# Patient Record
Sex: FEMALE | Race: BLACK OR AFRICAN AMERICAN | NOT HISPANIC OR LATINO | URBAN - METROPOLITAN AREA
[De-identification: names, ages, dates, MRNs, and addresses within clinical notes are randomized per-mention and may not be internally consistent; named-entity substitution may affect disease eponyms.]

---

## 2017-04-15 ENCOUNTER — OUTPATIENT (OUTPATIENT)
Dept: OUTPATIENT SERVICES | Facility: HOSPITAL | Age: 72
LOS: 1 days | Discharge: HOME | End: 2017-04-15

## 2017-06-27 DIAGNOSIS — D53.9 NUTRITIONAL ANEMIA, UNSPECIFIED: ICD-10-CM

## 2017-06-27 DIAGNOSIS — E03.9 HYPOTHYROIDISM, UNSPECIFIED: ICD-10-CM

## 2017-08-05 ENCOUNTER — OUTPATIENT (OUTPATIENT)
Dept: OUTPATIENT SERVICES | Facility: HOSPITAL | Age: 72
LOS: 1 days | Discharge: HOME | End: 2017-08-05

## 2017-08-05 DIAGNOSIS — D53.9 NUTRITIONAL ANEMIA, UNSPECIFIED: ICD-10-CM

## 2017-08-05 DIAGNOSIS — D51.0 VITAMIN B12 DEFICIENCY ANEMIA DUE TO INTRINSIC FACTOR DEFICIENCY: ICD-10-CM

## 2018-01-30 ENCOUNTER — OUTPATIENT (OUTPATIENT)
Dept: OUTPATIENT SERVICES | Facility: HOSPITAL | Age: 73
LOS: 1 days | Discharge: HOME | End: 2018-01-30

## 2018-01-30 DIAGNOSIS — Z00.00 ENCOUNTER FOR GENERAL ADULT MEDICAL EXAMINATION WITHOUT ABNORMAL FINDINGS: ICD-10-CM

## 2018-01-30 DIAGNOSIS — N39.0 URINARY TRACT INFECTION, SITE NOT SPECIFIED: ICD-10-CM

## 2018-01-30 DIAGNOSIS — E55.9 VITAMIN D DEFICIENCY, UNSPECIFIED: ICD-10-CM

## 2018-01-30 DIAGNOSIS — E11.9 TYPE 2 DIABETES MELLITUS WITHOUT COMPLICATIONS: ICD-10-CM

## 2018-01-30 DIAGNOSIS — E03.9 HYPOTHYROIDISM, UNSPECIFIED: ICD-10-CM

## 2018-02-05 ENCOUNTER — OUTPATIENT (OUTPATIENT)
Dept: OUTPATIENT SERVICES | Facility: HOSPITAL | Age: 73
LOS: 1 days | Discharge: HOME | End: 2018-02-05

## 2018-02-05 DIAGNOSIS — E55.9 VITAMIN D DEFICIENCY, UNSPECIFIED: ICD-10-CM

## 2018-02-05 DIAGNOSIS — E78.5 HYPERLIPIDEMIA, UNSPECIFIED: ICD-10-CM

## 2018-02-05 DIAGNOSIS — Z00.00 ENCOUNTER FOR GENERAL ADULT MEDICAL EXAMINATION WITHOUT ABNORMAL FINDINGS: ICD-10-CM

## 2018-05-24 ENCOUNTER — OUTPATIENT (OUTPATIENT)
Dept: OUTPATIENT SERVICES | Facility: HOSPITAL | Age: 73
LOS: 1 days | Discharge: HOME | End: 2018-05-24

## 2018-05-24 DIAGNOSIS — E03.9 HYPOTHYROIDISM, UNSPECIFIED: ICD-10-CM

## 2018-05-24 DIAGNOSIS — D51.0 VITAMIN B12 DEFICIENCY ANEMIA DUE TO INTRINSIC FACTOR DEFICIENCY: ICD-10-CM

## 2018-05-24 DIAGNOSIS — E11.9 TYPE 2 DIABETES MELLITUS WITHOUT COMPLICATIONS: ICD-10-CM

## 2018-05-24 DIAGNOSIS — N39.0 URINARY TRACT INFECTION, SITE NOT SPECIFIED: ICD-10-CM

## 2018-05-24 DIAGNOSIS — D51.8 OTHER VITAMIN B12 DEFICIENCY ANEMIAS: ICD-10-CM

## 2018-05-24 DIAGNOSIS — D53.9 NUTRITIONAL ANEMIA, UNSPECIFIED: ICD-10-CM

## 2018-05-24 DIAGNOSIS — E78.5 HYPERLIPIDEMIA, UNSPECIFIED: ICD-10-CM

## 2018-05-24 DIAGNOSIS — E55.9 VITAMIN D DEFICIENCY, UNSPECIFIED: ICD-10-CM

## 2018-10-12 ENCOUNTER — OUTPATIENT (OUTPATIENT)
Dept: OUTPATIENT SERVICES | Facility: HOSPITAL | Age: 73
LOS: 1 days | Discharge: HOME | End: 2018-10-12

## 2018-10-12 DIAGNOSIS — N39.0 URINARY TRACT INFECTION, SITE NOT SPECIFIED: ICD-10-CM

## 2018-10-12 DIAGNOSIS — D53.9 NUTRITIONAL ANEMIA, UNSPECIFIED: ICD-10-CM

## 2018-10-12 DIAGNOSIS — E03.9 HYPOTHYROIDISM, UNSPECIFIED: ICD-10-CM

## 2018-10-12 DIAGNOSIS — E11.9 TYPE 2 DIABETES MELLITUS WITHOUT COMPLICATIONS: ICD-10-CM

## 2018-10-12 DIAGNOSIS — E78.5 HYPERLIPIDEMIA, UNSPECIFIED: ICD-10-CM

## 2019-02-08 ENCOUNTER — OUTPATIENT (OUTPATIENT)
Dept: OUTPATIENT SERVICES | Facility: HOSPITAL | Age: 74
LOS: 1 days | Discharge: HOME | End: 2019-02-08

## 2019-02-08 DIAGNOSIS — E78.5 HYPERLIPIDEMIA, UNSPECIFIED: ICD-10-CM

## 2019-02-08 DIAGNOSIS — E11.9 TYPE 2 DIABETES MELLITUS WITHOUT COMPLICATIONS: ICD-10-CM

## 2019-02-08 DIAGNOSIS — E03.9 HYPOTHYROIDISM, UNSPECIFIED: ICD-10-CM

## 2019-02-08 DIAGNOSIS — D53.9 NUTRITIONAL ANEMIA, UNSPECIFIED: ICD-10-CM

## 2019-02-08 DIAGNOSIS — D51.0 VITAMIN B12 DEFICIENCY ANEMIA DUE TO INTRINSIC FACTOR DEFICIENCY: ICD-10-CM

## 2019-05-07 ENCOUNTER — OUTPATIENT (OUTPATIENT)
Dept: OUTPATIENT SERVICES | Facility: HOSPITAL | Age: 74
LOS: 1 days | Discharge: HOME | End: 2019-05-07

## 2019-05-07 DIAGNOSIS — E78.5 HYPERLIPIDEMIA, UNSPECIFIED: ICD-10-CM

## 2019-05-07 DIAGNOSIS — D53.9 NUTRITIONAL ANEMIA, UNSPECIFIED: ICD-10-CM

## 2019-05-07 DIAGNOSIS — E03.9 HYPOTHYROIDISM, UNSPECIFIED: ICD-10-CM

## 2019-05-07 DIAGNOSIS — D51.0 VITAMIN B12 DEFICIENCY ANEMIA DUE TO INTRINSIC FACTOR DEFICIENCY: ICD-10-CM

## 2019-05-07 DIAGNOSIS — E11.9 TYPE 2 DIABETES MELLITUS WITHOUT COMPLICATIONS: ICD-10-CM

## 2019-09-09 ENCOUNTER — OUTPATIENT (OUTPATIENT)
Dept: OUTPATIENT SERVICES | Facility: HOSPITAL | Age: 74
LOS: 1 days | Discharge: HOME | End: 2019-09-09

## 2019-09-09 DIAGNOSIS — D53.9 NUTRITIONAL ANEMIA, UNSPECIFIED: ICD-10-CM

## 2019-09-09 DIAGNOSIS — E78.5 HYPERLIPIDEMIA, UNSPECIFIED: ICD-10-CM

## 2019-09-09 DIAGNOSIS — N39.0 URINARY TRACT INFECTION, SITE NOT SPECIFIED: ICD-10-CM

## 2019-09-09 DIAGNOSIS — E11.9 TYPE 2 DIABETES MELLITUS WITHOUT COMPLICATIONS: ICD-10-CM

## 2023-02-28 PROBLEM — Z00.00 ENCOUNTER FOR PREVENTIVE HEALTH EXAMINATION: Status: ACTIVE | Noted: 2023-02-28

## 2023-03-03 ENCOUNTER — APPOINTMENT (OUTPATIENT)
Dept: CARDIOLOGY | Facility: CLINIC | Age: 78
End: 2023-03-03
Payer: MEDICARE

## 2023-03-03 VITALS
BODY MASS INDEX: 43.63 KG/M2 | TEMPERATURE: 97.6 F | SYSTOLIC BLOOD PRESSURE: 134 MMHG | DIASTOLIC BLOOD PRESSURE: 80 MMHG | HEART RATE: 78 BPM | WEIGHT: 278 LBS | HEIGHT: 67 IN

## 2023-03-03 DIAGNOSIS — Z82.49 FAMILY HISTORY OF ISCHEMIC HEART DISEASE AND OTHER DISEASES OF THE CIRCULATORY SYSTEM: ICD-10-CM

## 2023-03-03 DIAGNOSIS — Z78.9 OTHER SPECIFIED HEALTH STATUS: ICD-10-CM

## 2023-03-03 PROCEDURE — 93000 ELECTROCARDIOGRAM COMPLETE: CPT

## 2023-03-03 PROCEDURE — 99203 OFFICE O/P NEW LOW 30 MIN: CPT | Mod: 25

## 2023-03-04 PROBLEM — Z82.49 FAMILY HISTORY OF CEREBRAL ANEURYSM: Status: ACTIVE | Noted: 2023-03-04

## 2023-03-04 PROBLEM — Z78.9 NON-SMOKER: Status: ACTIVE | Noted: 2023-03-03

## 2023-03-04 RX ORDER — ALBUTEROL SULFATE 90 UG/1
108 (90 BASE) AEROSOL, METERED RESPIRATORY (INHALATION)
Refills: 0 | Status: ACTIVE | COMMUNITY

## 2023-03-04 NOTE — ASSESSMENT
[FreeTextEntry1] : 77 year old female is here post hospital discharge [facility]  for lower extremity edema. the patient also mentioned that she was diagnosed with real failure. S/p diuresis and feels better now. LE edema improved. \par \par Plan:\par Continue current meds \par instruction to increase diuretics if LE worsened and call the office for instructions. \par will obtain records and labs and cardiac testing fro [facility] \par follow up in 1-2 weeks \par

## 2023-03-04 NOTE — HISTORY OF PRESENT ILLNESS
[FreeTextEntry1] : 77 year old female is here post hospital discharge [facility]  for lower extremity edema. the patient also mentioned that she was diagnosed with real failure. S/p diuresis and feels better now. LE edema improved.

## 2023-03-17 ENCOUNTER — APPOINTMENT (OUTPATIENT)
Dept: CARDIOLOGY | Facility: CLINIC | Age: 78
End: 2023-03-17
Payer: MEDICARE

## 2023-03-17 ENCOUNTER — RESULT CHARGE (OUTPATIENT)
Age: 78
End: 2023-03-17

## 2023-03-17 VITALS
HEIGHT: 67 IN | SYSTOLIC BLOOD PRESSURE: 140 MMHG | WEIGHT: 280 LBS | HEART RATE: 88 BPM | TEMPERATURE: 97.7 F | DIASTOLIC BLOOD PRESSURE: 70 MMHG | BODY MASS INDEX: 43.95 KG/M2

## 2023-03-17 DIAGNOSIS — R05.9 COUGH, UNSPECIFIED: ICD-10-CM

## 2023-03-17 DIAGNOSIS — R06.2 WHEEZING: ICD-10-CM

## 2023-03-17 DIAGNOSIS — R06.00 DYSPNEA, UNSPECIFIED: ICD-10-CM

## 2023-03-17 LAB
ALBUMIN SERPL ELPH-MCNC: 4.1 G/DL
ALP BLD-CCNC: 91 U/L
ALT SERPL-CCNC: 11 U/L
ANION GAP SERPL CALC-SCNC: 14 MMOL/L
AST SERPL-CCNC: 18 U/L
BILIRUB SERPL-MCNC: 0.4 MG/DL
BUN SERPL-MCNC: 14 MG/DL
CALCIUM SERPL-MCNC: 9.9 MG/DL
CHLORIDE SERPL-SCNC: 102 MMOL/L
CO2 SERPL-SCNC: 25 MMOL/L
CREAT SERPL-MCNC: 1 MG/DL
EGFR: 58 ML/MIN/1.73M2
ESTIMATED AVERAGE GLUCOSE: 126 MG/DL
GLUCOSE SERPL-MCNC: 114 MG/DL
HBA1C MFR BLD HPLC: 6 %
HCT VFR BLD CALC: 37.5 %
HGB BLD-MCNC: 11.6 G/DL
MCHC RBC-ENTMCNC: 29.6 PG
MCHC RBC-ENTMCNC: 30.9 G/DL
MCV RBC AUTO: 95.7 FL
PLATELET # BLD AUTO: 364 K/UL
PMV BLD: 9 FL
POTASSIUM SERPL-SCNC: 4.4 MMOL/L
PROT SERPL-MCNC: 7 G/DL
RBC # BLD: 3.92 M/UL
RBC # FLD: 12.2 %
SODIUM SERPL-SCNC: 141 MMOL/L
TSH SERPL-ACNC: 1.42 UIU/ML
WBC # FLD AUTO: 9.97 K/UL

## 2023-03-17 PROCEDURE — 93000 ELECTROCARDIOGRAM COMPLETE: CPT

## 2023-03-17 PROCEDURE — 93306 TTE W/DOPPLER COMPLETE: CPT

## 2023-03-17 PROCEDURE — 99214 OFFICE O/P EST MOD 30 MIN: CPT | Mod: 25

## 2023-03-17 NOTE — PHYSICAL EXAM
[No Acute Distress] : no acute distress [Normal S1, S2] : normal S1, S2 [No Murmur] : no murmur [Wheeze ____] : wheeze [unfilled] [Normal] : moves all extremities, no focal deficits, normal speech [de-identified] : bl led edema

## 2023-03-17 NOTE — ASSESSMENT
[FreeTextEntry1] : 77 year old female is here post hospital discharge [facility]  for lower extremity edema. the patient also mentioned that she was diagnosed with renal failure. Unsuccessful attempts to get records from [facility]. The patient has cough ans wheezing today. Echo done today showed NL EF with no significant elevation in LV pressures SPAP 46 mmHg. Labs ordered today and reviewed. Continues to have LE edema. \par \par Plan:\par Dyspnea most likely pulmonary in origin \par No evidence of cardiac failure on echo \par CXR pulmonary referral \par increase Lasix to 40 q 24 \par nephrology referral ( single kidney) of evidence of Redd on labs from today. \par labs reviewed \par

## 2023-03-17 NOTE — HISTORY OF PRESENT ILLNESS
[FreeTextEntry1] : 77 year old female is here post hospital discharge [facility]  for lower extremity edema. the patient also mentioned that she was diagnosed with renal failure. Unsuccessful attempts to get records from [facility]. The patient has cough ans wheezing today. Echo done today showed NL EF with no significant elevation in LV pressures SPAP 46 mmHg. Labs ordered today and reviewed. Continues to have LE edema.

## 2023-03-17 NOTE — REVIEW OF SYSTEMS
[Feeling Fatigued] : feeling fatigued [SOB] : shortness of breath [Lower Ext Edema] : lower extremity edema [Wheezing] : wheezing [Negative] : Neurological

## 2023-07-10 ENCOUNTER — INPATIENT (INPATIENT)
Facility: HOSPITAL | Age: 78
LOS: 5 days | Discharge: HOME | DRG: 641 | End: 2023-07-16
Attending: INTERNAL MEDICINE | Admitting: INTERNAL MEDICINE
Payer: MEDICARE

## 2023-07-10 VITALS
HEART RATE: 95 BPM | TEMPERATURE: 98 F | RESPIRATION RATE: 20 BRPM | WEIGHT: 293 LBS | SYSTOLIC BLOOD PRESSURE: 182 MMHG | DIASTOLIC BLOOD PRESSURE: 86 MMHG | OXYGEN SATURATION: 96 % | HEIGHT: 67 IN

## 2023-07-10 DIAGNOSIS — Z90.5 ACQUIRED ABSENCE OF KIDNEY: ICD-10-CM

## 2023-07-10 DIAGNOSIS — I87.8 OTHER SPECIFIED DISORDERS OF VEINS: ICD-10-CM

## 2023-07-10 DIAGNOSIS — E66.01 MORBID (SEVERE) OBESITY DUE TO EXCESS CALORIES: ICD-10-CM

## 2023-07-10 DIAGNOSIS — C64.2 MALIGNANT NEOPLASM OF LEFT KIDNEY, EXCEPT RENAL PELVIS: ICD-10-CM

## 2023-07-10 DIAGNOSIS — M71.22 SYNOVIAL CYST OF POPLITEAL SPACE [BAKER], LEFT KNEE: ICD-10-CM

## 2023-07-10 DIAGNOSIS — Z98.890 OTHER SPECIFIED POSTPROCEDURAL STATES: Chronic | ICD-10-CM

## 2023-07-10 DIAGNOSIS — T46.1X5A ADVERSE EFFECT OF CALCIUM-CHANNEL BLOCKERS, INITIAL ENCOUNTER: ICD-10-CM

## 2023-07-10 DIAGNOSIS — R18.8 OTHER ASCITES: ICD-10-CM

## 2023-07-10 DIAGNOSIS — N18.31 CHRONIC KIDNEY DISEASE, STAGE 3A: ICD-10-CM

## 2023-07-10 DIAGNOSIS — E87.70 FLUID OVERLOAD, UNSPECIFIED: ICD-10-CM

## 2023-07-10 DIAGNOSIS — I12.9 HYPERTENSIVE CHRONIC KIDNEY DISEASE WITH STAGE 1 THROUGH STAGE 4 CHRONIC KIDNEY DISEASE, OR UNSPECIFIED CHRONIC KIDNEY DISEASE: ICD-10-CM

## 2023-07-10 DIAGNOSIS — Z90.5 ACQUIRED ABSENCE OF KIDNEY: Chronic | ICD-10-CM

## 2023-07-10 DIAGNOSIS — Z79.84 LONG TERM (CURRENT) USE OF ORAL HYPOGLYCEMIC DRUGS: ICD-10-CM

## 2023-07-10 DIAGNOSIS — Z91.010 ALLERGY TO PEANUTS: ICD-10-CM

## 2023-07-10 DIAGNOSIS — J44.9 CHRONIC OBSTRUCTIVE PULMONARY DISEASE, UNSPECIFIED: ICD-10-CM

## 2023-07-10 DIAGNOSIS — I27.20 PULMONARY HYPERTENSION, UNSPECIFIED: ICD-10-CM

## 2023-07-10 DIAGNOSIS — E87.79 OTHER FLUID OVERLOAD: ICD-10-CM

## 2023-07-10 DIAGNOSIS — M71.21 SYNOVIAL CYST OF POPLITEAL SPACE [BAKER], RIGHT KNEE: ICD-10-CM

## 2023-07-10 DIAGNOSIS — E11.22 TYPE 2 DIABETES MELLITUS WITH DIABETIC CHRONIC KIDNEY DISEASE: ICD-10-CM

## 2023-07-10 LAB
ALBUMIN SERPL ELPH-MCNC: 4.3 G/DL — SIGNIFICANT CHANGE UP (ref 3.5–5.2)
ALP SERPL-CCNC: 125 U/L — HIGH (ref 30–115)
ALT FLD-CCNC: 11 U/L — SIGNIFICANT CHANGE UP (ref 0–41)
ANION GAP SERPL CALC-SCNC: 14 MMOL/L — SIGNIFICANT CHANGE UP (ref 7–14)
AST SERPL-CCNC: 17 U/L — SIGNIFICANT CHANGE UP (ref 0–41)
BASOPHILS # BLD AUTO: 0.01 K/UL — SIGNIFICANT CHANGE UP (ref 0–0.2)
BASOPHILS NFR BLD AUTO: 0.1 % — SIGNIFICANT CHANGE UP (ref 0–1)
BILIRUB SERPL-MCNC: 0.5 MG/DL — SIGNIFICANT CHANGE UP (ref 0.2–1.2)
BUN SERPL-MCNC: 20 MG/DL — SIGNIFICANT CHANGE UP (ref 10–20)
CALCIUM SERPL-MCNC: 9.7 MG/DL — SIGNIFICANT CHANGE UP (ref 8.4–10.5)
CHLORIDE SERPL-SCNC: 101 MMOL/L — SIGNIFICANT CHANGE UP (ref 98–110)
CO2 SERPL-SCNC: 25 MMOL/L — SIGNIFICANT CHANGE UP (ref 17–32)
CREAT SERPL-MCNC: 1.2 MG/DL — SIGNIFICANT CHANGE UP (ref 0.7–1.5)
EGFR: 46 ML/MIN/1.73M2 — LOW
EOSINOPHIL # BLD AUTO: 0.33 K/UL — SIGNIFICANT CHANGE UP (ref 0–0.7)
EOSINOPHIL NFR BLD AUTO: 3.9 % — SIGNIFICANT CHANGE UP (ref 0–8)
GLUCOSE BLDC GLUCOMTR-MCNC: 133 MG/DL — HIGH (ref 70–99)
GLUCOSE SERPL-MCNC: 120 MG/DL — HIGH (ref 70–99)
HCT VFR BLD CALC: 44.6 % — SIGNIFICANT CHANGE UP (ref 37–47)
HGB BLD-MCNC: 14.5 G/DL — SIGNIFICANT CHANGE UP (ref 12–16)
IMM GRANULOCYTES NFR BLD AUTO: 0.4 % — HIGH (ref 0.1–0.3)
LYMPHOCYTES # BLD AUTO: 1.55 K/UL — SIGNIFICANT CHANGE UP (ref 1.2–3.4)
LYMPHOCYTES # BLD AUTO: 18.1 % — LOW (ref 20.5–51.1)
MCHC RBC-ENTMCNC: 30.3 PG — SIGNIFICANT CHANGE UP (ref 27–31)
MCHC RBC-ENTMCNC: 32.5 G/DL — SIGNIFICANT CHANGE UP (ref 32–37)
MCV RBC AUTO: 93.1 FL — SIGNIFICANT CHANGE UP (ref 81–99)
MONOCYTES # BLD AUTO: 0.6 K/UL — SIGNIFICANT CHANGE UP (ref 0.1–0.6)
MONOCYTES NFR BLD AUTO: 7 % — SIGNIFICANT CHANGE UP (ref 1.7–9.3)
NEUTROPHILS # BLD AUTO: 6.05 K/UL — SIGNIFICANT CHANGE UP (ref 1.4–6.5)
NEUTROPHILS NFR BLD AUTO: 70.5 % — SIGNIFICANT CHANGE UP (ref 42.2–75.2)
NRBC # BLD: 0 /100 WBCS — SIGNIFICANT CHANGE UP (ref 0–0)
NT-PROBNP SERPL-SCNC: 171 PG/ML — SIGNIFICANT CHANGE UP (ref 0–300)
PLATELET # BLD AUTO: 297 K/UL — SIGNIFICANT CHANGE UP (ref 130–400)
PMV BLD: 9 FL — SIGNIFICANT CHANGE UP (ref 7.4–10.4)
POTASSIUM SERPL-MCNC: 4.3 MMOL/L — SIGNIFICANT CHANGE UP (ref 3.5–5)
POTASSIUM SERPL-SCNC: 4.3 MMOL/L — SIGNIFICANT CHANGE UP (ref 3.5–5)
PROT SERPL-MCNC: 7.6 G/DL — SIGNIFICANT CHANGE UP (ref 6–8)
RBC # BLD: 4.79 M/UL — SIGNIFICANT CHANGE UP (ref 4.2–5.4)
RBC # FLD: 13.2 % — SIGNIFICANT CHANGE UP (ref 11.5–14.5)
SODIUM SERPL-SCNC: 140 MMOL/L — SIGNIFICANT CHANGE UP (ref 135–146)
TROPONIN T SERPL-MCNC: <0.01 NG/ML — SIGNIFICANT CHANGE UP
WBC # BLD: 8.57 K/UL — SIGNIFICANT CHANGE UP (ref 4.8–10.8)
WBC # FLD AUTO: 8.57 K/UL — SIGNIFICANT CHANGE UP (ref 4.8–10.8)

## 2023-07-10 PROCEDURE — 86900 BLOOD TYPING SEROLOGIC ABO: CPT

## 2023-07-10 PROCEDURE — 85610 PROTHROMBIN TIME: CPT

## 2023-07-10 PROCEDURE — 94640 AIRWAY INHALATION TREATMENT: CPT

## 2023-07-10 PROCEDURE — 85027 COMPLETE CBC AUTOMATED: CPT

## 2023-07-10 PROCEDURE — 83735 ASSAY OF MAGNESIUM: CPT

## 2023-07-10 PROCEDURE — 99285 EMERGENCY DEPT VISIT HI MDM: CPT

## 2023-07-10 PROCEDURE — 93970 EXTREMITY STUDY: CPT

## 2023-07-10 PROCEDURE — 85025 COMPLETE CBC W/AUTO DIFF WBC: CPT

## 2023-07-10 PROCEDURE — 74177 CT ABD & PELVIS W/CONTRAST: CPT

## 2023-07-10 PROCEDURE — 83036 HEMOGLOBIN GLYCOSYLATED A1C: CPT

## 2023-07-10 PROCEDURE — 86850 RBC ANTIBODY SCREEN: CPT

## 2023-07-10 PROCEDURE — 80061 LIPID PANEL: CPT

## 2023-07-10 PROCEDURE — 82962 GLUCOSE BLOOD TEST: CPT

## 2023-07-10 PROCEDURE — 97162 PT EVAL MOD COMPLEX 30 MIN: CPT | Mod: GP

## 2023-07-10 PROCEDURE — 80053 COMPREHEN METABOLIC PANEL: CPT

## 2023-07-10 PROCEDURE — 99223 1ST HOSP IP/OBS HIGH 75: CPT

## 2023-07-10 PROCEDURE — 71045 X-RAY EXAM CHEST 1 VIEW: CPT | Mod: 26

## 2023-07-10 PROCEDURE — 80048 BASIC METABOLIC PNL TOTAL CA: CPT

## 2023-07-10 PROCEDURE — 36415 COLL VENOUS BLD VENIPUNCTURE: CPT

## 2023-07-10 PROCEDURE — 85730 THROMBOPLASTIN TIME PARTIAL: CPT

## 2023-07-10 PROCEDURE — 86901 BLOOD TYPING SEROLOGIC RH(D): CPT

## 2023-07-10 PROCEDURE — 93306 TTE W/DOPPLER COMPLETE: CPT

## 2023-07-10 PROCEDURE — 76770 US EXAM ABDO BACK WALL COMP: CPT

## 2023-07-10 RX ORDER — PIOGLITAZONE HYDROCHLORIDE 15 MG/1
1 TABLET ORAL
Refills: 0 | DISCHARGE

## 2023-07-10 RX ORDER — ALBUTEROL 90 UG/1
2 AEROSOL, METERED ORAL EVERY 6 HOURS
Refills: 0 | Status: DISCONTINUED | OUTPATIENT
Start: 2023-07-10 | End: 2023-07-16

## 2023-07-10 RX ORDER — FLUTICASONE FUROATE AND VILANTEROL TRIFENATATE 100; 25 UG/1; UG/1
1 POWDER RESPIRATORY (INHALATION)
Refills: 0 | DISCHARGE

## 2023-07-10 RX ORDER — METFORMIN HYDROCHLORIDE 850 MG/1
1 TABLET ORAL
Refills: 0 | DISCHARGE

## 2023-07-10 RX ORDER — FUROSEMIDE 40 MG
40 TABLET ORAL
Refills: 0 | Status: DISCONTINUED | OUTPATIENT
Start: 2023-07-10 | End: 2023-07-11

## 2023-07-10 RX ORDER — NIFEDIPINE 30 MG
60 TABLET, EXTENDED RELEASE 24 HR ORAL DAILY
Refills: 0 | Status: DISCONTINUED | OUTPATIENT
Start: 2023-07-10 | End: 2023-07-13

## 2023-07-10 RX ORDER — BUDESONIDE AND FORMOTEROL FUMARATE DIHYDRATE 160; 4.5 UG/1; UG/1
2 AEROSOL RESPIRATORY (INHALATION)
Refills: 0 | Status: DISCONTINUED | OUTPATIENT
Start: 2023-07-10 | End: 2023-07-16

## 2023-07-10 RX ORDER — ALBUTEROL 90 UG/1
2 AEROSOL, METERED ORAL
Refills: 0 | DISCHARGE

## 2023-07-10 RX ORDER — ENOXAPARIN SODIUM 100 MG/ML
40 INJECTION SUBCUTANEOUS EVERY 24 HOURS
Refills: 0 | Status: DISCONTINUED | OUTPATIENT
Start: 2023-07-10 | End: 2023-07-16

## 2023-07-10 RX ADMIN — Medication 40 MILLIGRAM(S): at 17:04

## 2023-07-10 NOTE — H&P ADULT - NSHPLABSRESULTS_GEN_ALL_CORE
14.5   8.57  )-----------( 297      ( 10 Jul 2023 13:26 )             44.6       07-10    140  |  101  |  20  ----------------------------<  120<H>  4.3   |  25  |  1.2    Ca    9.7      10 Jul 2023 13:26    TPro  7.6  /  Alb  4.3  /  TBili  0.5  /  DBili  x   /  AST  17  /  ALT  11  /  AlkPhos  125<H>  07-10              Urinalysis Basic - ( 10 Jul 2023 13:26 )    Color: x / Appearance: x / SG: x / pH: x  Gluc: 120 mg/dL / Ketone: x  / Bili: x / Urobili: x   Blood: x / Protein: x / Nitrite: x   Leuk Esterase: x / RBC: x / WBC x   Sq Epi: x / Non Sq Epi: x / Bacteria: x        CARDIAC MARKERS ( 10 Jul 2023 13:26 )  x     / <0.01 ng/mL / x     / x     / x

## 2023-07-10 NOTE — H&P ADULT - NSHPPHYSICALEXAM_GEN_ALL_CORE
PHYSICAL EXAM:  GENERAL: NAD  EYES: conjunctiva and sclera clear  ENMT: No tonsillar erythema, exudates, or enlargement; Moist mucous membranes  NECK: Supple, No JVD, Normal thyroid  HEART: Regular rate and rhythm; No murmurs, rubs, or gallops, Normal S1 S2   RESPIRATORY: Clear to auscultation bilaterally, resonant percussion note, no added sounds   ABDOMEN: Soft, Nontender, distended; Bowel sounds present  NEUROLOGY: A&Ox3, grossly intact power and sensation   EXTREMITIES:  2+ Peripheral Pulses, +4 pitting edema up to the hip  SKIN: warm, dry, normal color, no rash or abnormal lesions

## 2023-07-10 NOTE — ED PROVIDER NOTE - PHYSICAL EXAMINATION
VITAL SIGNS: I have reviewed nursing notes and confirm.  CONSTITUTIONAL: Well-appearing, non-toxic, in NAD  SKIN: Warm dry, normal skin turgor  HEAD: NCAT  EYES: No conjunctival injection, scleral anicteric  ENT: MMM  NECK: Supple; FROM.   CARD: RRR  RESP: CTAB  ABD: Soft, NDNT  EXT: 2+ pitting edema b/l without calf tenderness, no skin changes.   NEURO: Grossly normal examination, CN grossly intact  PSYCH: Cooperative, appropriate

## 2023-07-10 NOTE — ED PROVIDER NOTE - ATTENDING CONTRIBUTION TO CARE
77-year-old female with a past medical history significant for renal cancer status post nephrectomy hypertension diabetes who presents with lower extremity swelling.  Patient states that since January she has been having lower extremity edema.  Patient denies any nausea vomiting chest pain or any other medical complaints.    VITAL SIGNS: I have reviewed nursing notes and confirm.  CONSTITUTIONAL: non-toxic, well appearing  SKIN: no rash, no petechiae.  EYES: L, EOMI, pink conjunctiva, anicteric  ENT: tongue midline, no exudates, MMM  NECK: Supple; no meningismus, no JVD  CARD: RRR, no murmurs, equal radial pulses bilaterally 2+  RESP: CTAB, no respiratory distress  ABD: Soft, non-tender, non-distended, no peritoneal signs, no HSM, no CVA tenderness  EXT: Normal ROM x4.  +2 pitting edema.     77-year-old female presents with lower extremity edema.  EKG imaging labs to reassess dispo pending.

## 2023-07-10 NOTE — H&P ADULT - NSICDXPASTMEDICALHX_GEN_ALL_CORE_FT
PAST MEDICAL HISTORY:  Asthmatic bronchitis , chronic     Hypertension     Primary cancer of kidney     Type 2 diabetes mellitus

## 2023-07-10 NOTE — ED PROVIDER NOTE - OBJECTIVE STATEMENT
76yo female PMHx solitary kidney s/p renal ca, HTN, and DM presenting with atraumatic BLE swelling worsening over the past 2 weeks without any associated cp, cough, dizziness/lightheadedness/syncope, n/v/d/ap/us. Pt says she is compliant with her Lasix. No hx blood clots.     Per YAMILKA, pt seen by cardiology in 03/2023 and had a normal echo. Admitted previously to [facility] for swelling. 78yo female PMHx solitary kidney s/p renal ca, HTN, and DM presenting with atraumatic BLE swelling worsening over the past 2 weeks, notes that she feels "weak" when exerting herself. No associated cp, cough, dizziness/lightheadedness/syncope, n/v/d/ap/us. Pt says she is compliant with her Lasix. No hx blood clots.     Per YAMILKA pt seen by cardiology (Paul) in 03/2023 and had a normal echo, he doubled her Lasix. Admitted previously to [facility] for swelling.

## 2023-07-10 NOTE — ED PROVIDER NOTE - CLINICAL SUMMARY MEDICAL DECISION MAKING FREE TEXT BOX
77-year-old female presents with lower extremity edema.  EKG imaging labs. concern for fluid overload. Labs and EKG were ordered and reviewed.  Imaging was ordered and reviewed by me.  Appropriate medications for patient's presenting complaints were ordered and effects were reassessed.  Patient's records (prior hospital, ED visit, and/or nursing home notes if available) were reviewed.  Additional history was obtained from EMS, family, and/or PCP (where available).  Escalation to admission/observation was considered.  Patient requires inpatient hospitalization - medicine.

## 2023-07-10 NOTE — H&P ADULT - NSHPSOCIALHISTORY_GEN_ALL_CORE
No history of smoking  occasional alcohol intake  No recreational drug use  Used to work as nurses aid

## 2023-07-10 NOTE — H&P ADULT - ATTENDING COMMENTS
Patient seen and examined at bedside independently of the residents. I read the resident's note and agree with the plan with the additions and corrections as noted below. My note supersedes the resident's note.     REVIEW OF SYSTEMS:  Negative except in HPI.     PMH: HTN, DM, Asthma, renal cancer S/P left nephrectomy, HFpEF?    FHx: Reviewed. No fhx of asthma/copd, No fhx of liver and pulmonary disease. No fhx of hematological disorder.     Physical Exam:  GEN: No acute distress. Awake, Alert and oriented x 3.   Head: Atraumatic, Normocephalic.   Eye: PEERLA. No sclera icterus. EOMI.   ENT: Normal oropharynx, no thyromegaly, no mass, no lymphadenopathy.   LUNGS: Clear to auscultation bilaterally. No wheeze/rales/crackles.   HEART: Normal. S1/S2 present. RRR. No murmur/gallops.   ABD: Soft, non-tender, non-distended. Bowel sounds present.   EXT: 2+ pitting edema. No erythema. No tenderness.  Integumentary: No rash, No sore, No petechia.   NEURO: CN III-XII intact. Strength: 5/5 b/l ULE. Sensory intact b/l ULE.     Vital Signs Last 24 Hrs  T(C): 36.6 (10 Jul 2023 11:31), Max: 36.6 (10 Jul 2023 11:31)  T(F): 97.8 (10 Jul 2023 11:31), Max: 97.8 (10 Jul 2023 11:31)  HR: 71 (10 Jul 2023 15:26) (71 - 95)  BP: 155/76 (10 Jul 2023 15:26) (155/76 - 182/86)  BP(mean): --  RR: 16 (10 Jul 2023 15:26) (16 - 20)  SpO2: 97% (10 Jul 2023 15:26) (96% - 97%)    Parameters below as of 10 Jul 2023 15:26  Patient On (Oxygen Delivery Method): room air      Please see the above notes for Labs and radiology.     Assessment and Plan:     76 yo F with hx of HTN, DM, Asthma, renal cancer S/P left nephrectomy, HFpEF? presents to ED for 2 weeks hx of worsening LE  edema a/w dyspnea on exertion.     Dyspnea on exertion and LE edema likely 2/2 acute CHF exacerbation   - CXR shows b/l pulmonary vascular congestion with interstitial opacity to my read (pending official report).   - s/p IV lasix 40mg x 1 in ED.   - c/w IV lasix 40mg BID  - check TTE  - monitor daily weight, strict I & O, Low Na diet with fluid restriction  - Cardiology consult.     HTN - on nifedipine   DM II - monitor FS AC HS. May start on insulin if FS persistently > 180.   Left renal cancer s/p left nephrectomy - follow up outpatient.     DVT ppx: Lovenox SC  GI ppx:  not indicated.   Diet: DASH/CC diet   Activity: as tolerated.     Date seen by the attendin/10/2023  Total time spent: 75 minutes.

## 2023-07-10 NOTE — H&P ADULT - NSHPREVIEWOFSYSTEMS_GEN_ALL_CORE
REVIEW OF SYSTEMS:    CONSTITUTIONAL: generalized weakness, No fevers or chills  EYES/ENT: No visual changes;  No vertigo or throat pain, no headache   NECK: No pain or stiffness  RESPIRATORY: No cough, wheezing, hemoptysis; dyspnea on exertion   CARDIOVASCULAR: In HPI   GASTROINTESTINAL: dull left flank pain. No nausea, vomiting, or hematemesis; No diarrhea or constipation. No melena or hematochezia.  GENITOURINARY: No dysuria, frequency or hematuria  NEUROLOGICAL: No numbness or weakness  SKIN: No itching, rashes  MUSCULOSKELETAL: no arthralgia or arthritis

## 2023-07-10 NOTE — H&P ADULT - HISTORY OF PRESENT ILLNESS
77 year old female with past medical history of HTN, DM, Asthma, renal cancer S/P left nephrectomy, HFpEF?. She was doing relatively well until 2 weeks ago when she started noticing progressive lower limb edema started from ankles and going up to thighs. It is associated with dyspnea on exertion with left flank dull pain. There is no chest pain, no palpitations, no orthopnea, or paroxysmal nocturnal dyspnea. She is compliant with her medications, no change in diet, no extra salt intake. No fever or chills, no arthralgia. No change in urine amount, color or consistency.     She was admitted in January due to lower limb edema to [facility], treated with diuretics and discharged home without final diagnosis. Followed with her cardiologist in March and he increased her Lasix dose.     In ED ICU Vital Signs T(F): 97.8 HR: 71 BP: 155/76 RR: 16 SpO2: 97%     Admitted for further workup and management

## 2023-07-10 NOTE — H&P ADULT - ASSESSMENT
77 year old female with past medical history of HTN, DM, Asthma, renal cancer S/P left nephrectomy, HFpEF?. presented complaining of 2 weeks of progressive lower limb edema started from ankles and going up to thighs.    Admitted for further workup and management       # Bilateral pitting lower limb edema   # HFpEF exacerbation   # Hx of HFpEF?  - On home Lasix 40 mg daily  - Compliant with medications and dietary restrictions  - CXR showed left trace pleural effusion, with bilateral increased interstitial infiltrates   - EKG showing left ventricular hypertrophy, normal sinus rhythm   - negative BNP and troponin  - Start Lasix 40X2 IV   - Daily weight  - strict intake output  - strict fluid intake to 1.5 L  - DASH diet, low salt intake  - Send urinalysis - look for proteinuria ( obesity,  )  - Telemetry monitoring   - TTE   - Cardiology consult ( Dr Miller )      # Hx of Asthma - not in exacerbation  - On home albuterol and Breo ellipta  - Continue on albuterol and Symbicort     # HTN  - On nifedipine 60 mg daily at home  - Continue home medication      # DM   - On home metformin 500X2, pioglitazone 45 mg daily  - DC home medications  - start insulin sliding scale   - monitor finger stick, start Lantus if persistently more than 180      # GI prophylaxis: none  # DVT prophylaxis: Lovenox 40X1  # Full code  # Diet: DASH, fluid restriction, carb consistent

## 2023-07-10 NOTE — H&P ADULT - NSICDXFAMILYHX_GEN_ALL_CORE_FT
FAMILY HISTORY:  Sibling  Still living? Unknown  Family history of bone cancer, Age at diagnosis: Age Unknown

## 2023-07-11 LAB
A1C WITH ESTIMATED AVERAGE GLUCOSE RESULT: 6.1 % — HIGH (ref 4–5.6)
ALBUMIN SERPL ELPH-MCNC: 3.6 G/DL — SIGNIFICANT CHANGE UP (ref 3.5–5.2)
ALP SERPL-CCNC: 100 U/L — SIGNIFICANT CHANGE UP (ref 30–115)
ALT FLD-CCNC: 9 U/L — SIGNIFICANT CHANGE UP (ref 0–41)
ANION GAP SERPL CALC-SCNC: 14 MMOL/L — SIGNIFICANT CHANGE UP (ref 7–14)
AST SERPL-CCNC: 18 U/L — SIGNIFICANT CHANGE UP (ref 0–41)
BASOPHILS # BLD AUTO: 0.02 K/UL — SIGNIFICANT CHANGE UP (ref 0–0.2)
BASOPHILS NFR BLD AUTO: 0.3 % — SIGNIFICANT CHANGE UP (ref 0–1)
BILIRUB SERPL-MCNC: 0.7 MG/DL — SIGNIFICANT CHANGE UP (ref 0.2–1.2)
BUN SERPL-MCNC: 18 MG/DL — SIGNIFICANT CHANGE UP (ref 10–20)
CALCIUM SERPL-MCNC: 9.2 MG/DL — SIGNIFICANT CHANGE UP (ref 8.4–10.5)
CHLORIDE SERPL-SCNC: 100 MMOL/L — SIGNIFICANT CHANGE UP (ref 98–110)
CHOLEST SERPL-MCNC: 174 MG/DL — SIGNIFICANT CHANGE UP
CO2 SERPL-SCNC: 24 MMOL/L — SIGNIFICANT CHANGE UP (ref 17–32)
CREAT SERPL-MCNC: 1.2 MG/DL — SIGNIFICANT CHANGE UP (ref 0.7–1.5)
EGFR: 46 ML/MIN/1.73M2 — LOW
EOSINOPHIL # BLD AUTO: 0.38 K/UL — SIGNIFICANT CHANGE UP (ref 0–0.7)
EOSINOPHIL NFR BLD AUTO: 5.5 % — SIGNIFICANT CHANGE UP (ref 0–8)
ESTIMATED AVERAGE GLUCOSE: 128 MG/DL — HIGH (ref 68–114)
GLUCOSE BLDC GLUCOMTR-MCNC: 123 MG/DL — HIGH (ref 70–99)
GLUCOSE BLDC GLUCOMTR-MCNC: 148 MG/DL — HIGH (ref 70–99)
GLUCOSE BLDC GLUCOMTR-MCNC: 185 MG/DL — HIGH (ref 70–99)
GLUCOSE BLDC GLUCOMTR-MCNC: 201 MG/DL — HIGH (ref 70–99)
GLUCOSE SERPL-MCNC: 126 MG/DL — HIGH (ref 70–99)
HCT VFR BLD CALC: 43.4 % — SIGNIFICANT CHANGE UP (ref 37–47)
HDLC SERPL-MCNC: 92 MG/DL — SIGNIFICANT CHANGE UP
HGB BLD-MCNC: 14 G/DL — SIGNIFICANT CHANGE UP (ref 12–16)
IMM GRANULOCYTES NFR BLD AUTO: 0.3 % — SIGNIFICANT CHANGE UP (ref 0.1–0.3)
LIPID PNL WITH DIRECT LDL SERPL: 69 MG/DL — SIGNIFICANT CHANGE UP
LYMPHOCYTES # BLD AUTO: 1.45 K/UL — SIGNIFICANT CHANGE UP (ref 1.2–3.4)
LYMPHOCYTES # BLD AUTO: 21.1 % — SIGNIFICANT CHANGE UP (ref 20.5–51.1)
MAGNESIUM SERPL-MCNC: 2.2 MG/DL — SIGNIFICANT CHANGE UP (ref 1.8–2.4)
MCHC RBC-ENTMCNC: 30.1 PG — SIGNIFICANT CHANGE UP (ref 27–31)
MCHC RBC-ENTMCNC: 32.3 G/DL — SIGNIFICANT CHANGE UP (ref 32–37)
MCV RBC AUTO: 93.3 FL — SIGNIFICANT CHANGE UP (ref 81–99)
MONOCYTES # BLD AUTO: 0.7 K/UL — HIGH (ref 0.1–0.6)
MONOCYTES NFR BLD AUTO: 10.2 % — HIGH (ref 1.7–9.3)
NEUTROPHILS # BLD AUTO: 4.29 K/UL — SIGNIFICANT CHANGE UP (ref 1.4–6.5)
NEUTROPHILS NFR BLD AUTO: 62.6 % — SIGNIFICANT CHANGE UP (ref 42.2–75.2)
NON HDL CHOLESTEROL: 82 MG/DL — SIGNIFICANT CHANGE UP
NRBC # BLD: 0 /100 WBCS — SIGNIFICANT CHANGE UP (ref 0–0)
PLATELET # BLD AUTO: 287 K/UL — SIGNIFICANT CHANGE UP (ref 130–400)
PMV BLD: 9.3 FL — SIGNIFICANT CHANGE UP (ref 7.4–10.4)
POTASSIUM SERPL-MCNC: 4.1 MMOL/L — SIGNIFICANT CHANGE UP (ref 3.5–5)
POTASSIUM SERPL-SCNC: 4.1 MMOL/L — SIGNIFICANT CHANGE UP (ref 3.5–5)
PROT SERPL-MCNC: 6.4 G/DL — SIGNIFICANT CHANGE UP (ref 6–8)
RBC # BLD: 4.65 M/UL — SIGNIFICANT CHANGE UP (ref 4.2–5.4)
RBC # FLD: 13.2 % — SIGNIFICANT CHANGE UP (ref 11.5–14.5)
SODIUM SERPL-SCNC: 138 MMOL/L — SIGNIFICANT CHANGE UP (ref 135–146)
TRIGL SERPL-MCNC: 65 MG/DL — SIGNIFICANT CHANGE UP
WBC # BLD: 6.86 K/UL — SIGNIFICANT CHANGE UP (ref 4.8–10.8)
WBC # FLD AUTO: 6.86 K/UL — SIGNIFICANT CHANGE UP (ref 4.8–10.8)

## 2023-07-11 PROCEDURE — 99233 SBSQ HOSP IP/OBS HIGH 50: CPT

## 2023-07-11 PROCEDURE — 99223 1ST HOSP IP/OBS HIGH 75: CPT

## 2023-07-11 RX ORDER — DEXTROSE 50 % IN WATER 50 %
15 SYRINGE (ML) INTRAVENOUS ONCE
Refills: 0 | Status: DISCONTINUED | OUTPATIENT
Start: 2023-07-11 | End: 2023-07-16

## 2023-07-11 RX ORDER — GLUCAGON INJECTION, SOLUTION 0.5 MG/.1ML
1 INJECTION, SOLUTION SUBCUTANEOUS ONCE
Refills: 0 | Status: DISCONTINUED | OUTPATIENT
Start: 2023-07-11 | End: 2023-07-16

## 2023-07-11 RX ORDER — DEXTROSE 50 % IN WATER 50 %
25 SYRINGE (ML) INTRAVENOUS ONCE
Refills: 0 | Status: DISCONTINUED | OUTPATIENT
Start: 2023-07-11 | End: 2023-07-16

## 2023-07-11 RX ORDER — SODIUM CHLORIDE 9 MG/ML
1000 INJECTION, SOLUTION INTRAVENOUS
Refills: 0 | Status: DISCONTINUED | OUTPATIENT
Start: 2023-07-11 | End: 2023-07-16

## 2023-07-11 RX ORDER — FUROSEMIDE 40 MG
40 TABLET ORAL DAILY
Refills: 0 | Status: DISCONTINUED | OUTPATIENT
Start: 2023-07-12 | End: 2023-07-12

## 2023-07-11 RX ORDER — INSULIN LISPRO 100/ML
VIAL (ML) SUBCUTANEOUS
Refills: 0 | Status: DISCONTINUED | OUTPATIENT
Start: 2023-07-11 | End: 2023-07-16

## 2023-07-11 RX ORDER — DEXTROSE 50 % IN WATER 50 %
12.5 SYRINGE (ML) INTRAVENOUS ONCE
Refills: 0 | Status: DISCONTINUED | OUTPATIENT
Start: 2023-07-11 | End: 2023-07-16

## 2023-07-11 RX ADMIN — Medication 40 MILLIGRAM(S): at 05:28

## 2023-07-11 RX ADMIN — Medication 60 MILLIGRAM(S): at 05:28

## 2023-07-11 RX ADMIN — ENOXAPARIN SODIUM 40 MILLIGRAM(S): 100 INJECTION SUBCUTANEOUS at 17:35

## 2023-07-11 NOTE — PROGRESS NOTE ADULT - ASSESSMENT
8 yo F with hx of HTN, DM, Asthma, renal cancer S/P left nephrectomy, HFpEF? presents to ED for 2 weeks hx of worsening LE  edema a/w dyspnea on exertion.     #Dyspnea on exertion and LE edema  - CXR: Bilateral interstitial/vascular prominence could reflect edema in the appropriate clinical setting.  - Pt recently seen at [facility] for similar LE edema, given Lasix, discharged without final diagnosis   - s/p IV lasix 40mg x 1 in ED.   - IV Lasix switched to PO Lasix 40mg qd   - f/u TTE   - monitor daily weight, strict I & O, Low Na diet with fluid restriction  - f/u Cardiology consult  - PT eval     #HTN   -c/w nifedipine  60mg qd nifedipine     #T2DM  -monitor FS  -ISS    #Left renal cancer s/p left nephrectomy  -outpatient followup     DVT ppx: Lovenox SC  GI ppx:  not indicated.   Diet: DASH/CC diet   Activity: as tolerated.

## 2023-07-11 NOTE — CONSULT NOTE ADULT - SUBJECTIVE AND OBJECTIVE BOX
Outpt cardiologist: Dr Miller    HPI:  77 year old female with past medical history of HTN, DM, Asthma, renal cancer S/P left nephrectomy, HFpEF?. She was doing relatively well until 2 weeks ago when she started noticing progressive lower limb edema started from ankles and going up to thighs. It is associated with dyspnea on exertion with left flank dull pain. There is no chest pain, no palpitations, no orthopnea, or paroxysmal nocturnal dyspnea. She is compliant with her medications, no change in diet, no extra salt intake. No fever or chills, no arthralgia. No change in urine amount, color or consistency.     She was admitted in January due to lower limb edema to [facility], treated with diuretics and discharged home without final diagnosis. Followed with her cardiologist in March and he increased her Lasix dose.     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 Recent hospitalization at [facility] w/ no clear diagnosis. Patient followed up w/ Dr Miller, TTE was done which showed normal EF & no valvulopathy.    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)      ALLERGIES:  No Known Drug Allergies  peanuts (Anaphylaxis)      MEDICATIONS:  budesonide  80 MICROgram(s)/formoterol 4.5 MICROgram(s) Inhaler 2 Puff(s) Inhalation two times a day  enoxaparin Injectable 40 milliGRAM(s) SubCutaneous every 24 hours  furosemide   Injectable 40 milliGRAM(s) IV Push two times a day  NIFEdipine XL 60 milliGRAM(s) Oral daily    PRN:  albuterol    90 MICROgram(s) HFA Inhaler 2 Puff(s) Inhalation every 6 hours PRN      HOME MEDICATIONS:  Home Medications:  Albuterol (Eqv-ProAir HFA) 90 mcg/inh inhalation aerosol: 2 puff(s) inhaled every 6 hours as needed for  bronchospasm (10 Jul 2023 16:15)  fluticasone-vilanterol 100 mcg-25 mcg/inh inhalation powder: 1 puff(s) inhaled once a day (10 Jul 2023 16:15)  furosemide 40 mg oral tablet: 1 orally once a day (10 Jul 2023 16:15)  metFORMIN 500 mg oral tablet: 1 tab(s) orally 2 times a day (10 Jul 2023 16:15)  NIFEdipine 60 mg oral tablet, extended release: 1 tab(s) orally once a day (10 Jul 2023 16:15)  pioglitazone 45 mg oral tablet: 1 tab(s) orally once a day (10 Jul 2023 16:15)      VITALS:   T(F): 97.8 ( @ 05:37), Max: 98.4 (07-10 @ 23:49)  HR: 65 ( @ 05:37) (65 - 95)  BP: 147/68 ( @ 05:37) (134/63 - 182/86)  BP(mean): 100 ( @ 00:51) (91 - 100)  RR: 19 ( @ 05:37) (16 - 20)  SpO2: 96% ( @ 05:37) (96% - 97%)    I&O's Summary    10 Jul 2023 07:01  -  2023 07:00  --------------------------------------------------------  IN: 0 mL / OUT: 1300 mL / NET: -1300 mL    # ROS & PE pending patient encounter    REVIEW OF SYSTEMS:  CONSTITUTIONAL: No weakness, fevers or chills  HEENT: No visual changes, neck/ear pain  RESPIRATORY: No cough, sob  CARDIOVASCULAR: See HPI  GASTROINTESTINAL: No abdominal pain. No nausea, vomiting, diarrhea   GENITOURINARY: No dysuria, frequency or hematuria  NEUROLOGICAL: No new focal deficits  SKIN: No new rashes    PHYSICAL EXAM:  General: Not in distress.  Non-toxic appearing.   HEENT: EOMI  Cardio: regular, S1, S2, no murmur  Pulm: B/L BS.  No wheezing / crackles / rales  Abdomen: Soft, non-tender, non-distended. Normoactive bowel sounds  Extremities: No edema b/l le  Neuro: A&O x3. No focal deficits    LABS:                        14.0   6.86  )-----------( 287      ( 2023 06:50 )             43.4         138  |  100  |  18  ----------------------------<  126<H>  4.1   |  24  |  1.2    Ca    9.2      2023 06:50  Mg     2.2         TPro  6.4  /  Alb  3.6  /  TBili  0.7  /  DBili  x   /  AST  18  /  ALT  9   /  AlkPhos  100        Troponin T, Serum: <0.01 ng/mL (07-10-23 @ 13:26)    CARDIAC MARKERS ( 10 Jul 2023 13:26 )  x     / <0.01 ng/mL / x     / x     / x            Troponin trend:       Chol 174 LDL -- HDL 92 Trig 65      RADIOLOGY:  -CXR: b/l interstitial infiltrates 7/10/23  -TTE: 3/17/23 EF 63%, normal LA size, PHTN 46, IVC within normal limits and compressible, no valvulopathy (PV not visualized)  -CCTA:  -STRESS TEST:  -CATHETERIZATION:  -OTHER:  EC Lead ECG:   Ventricular Rate 65 BPM    Atrial Rate 65 BPM    P-R Interval 152 ms    QRS Duration 88 ms    Q-T Interval 408 ms    QTC Calculation(Bazett) 424 ms    P Axis 68 degrees    R Axis 78 degrees    T Axis 50 degrees    Diagnosis Line Normal sinus rhythm  Possible Left atrial enlargement  Left ventricular hypertrophy ( Sokolow-Jackman )  Borderline ECG    Confirmed by Narinder Olivera ([id number]) on 7/10/2023 2:57:14 PM (07-10 @ 14:27)      TELEMETRY EVENTS: none   Outpt cardiologist: Dr Miller    HPI:  77 year old female with past medical history of HTN, DM, Asthma, renal cancer S/P left nephrectomy, HFpEF?. She was doing relatively well until 2 weeks ago when she started noticing progressive lower limb edema started from ankles and going up to thighs. It is associated with dyspnea on exertion with left flank dull pain. There is no chest pain, no palpitations, no orthopnea, or paroxysmal nocturnal dyspnea. She is compliant with her medications, no change in diet, no extra salt intake. No fever or chills, no arthralgia. No change in urine amount, color or consistency.     She was admitted in January due to lower limb edema to [facility], treated with diuretics and discharged home without final diagnosis. Followed with her cardiologist in March and he increased her Lasix dose.     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 Recent hospitalization at [facility] w/ no clear diagnosis. Patient followed up w/ Dr Miller, TTE was done which showed normal EF & no valvulopathy.    Patient reports no response to Lasix as outpatient. 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)      ALLERGIES:  No Known Drug Allergies  peanuts (Anaphylaxis)      MEDICATIONS:  budesonide  80 MICROgram(s)/formoterol 4.5 MICROgram(s) Inhaler 2 Puff(s) Inhalation two times a day  enoxaparin Injectable 40 milliGRAM(s) SubCutaneous every 24 hours  furosemide   Injectable 40 milliGRAM(s) IV Push two times a day  NIFEdipine XL 60 milliGRAM(s) Oral daily    PRN:  albuterol    90 MICROgram(s) HFA Inhaler 2 Puff(s) Inhalation every 6 hours PRN      HOME MEDICATIONS:  Home Medications:  Albuterol (Eqv-ProAir HFA) 90 mcg/inh inhalation aerosol: 2 puff(s) inhaled every 6 hours as needed for  bronchospasm (10 Jul 2023 16:15)  fluticasone-vilanterol 100 mcg-25 mcg/inh inhalation powder: 1 puff(s) inhaled once a day (10 Jul 2023 16:15)  furosemide 40 mg oral tablet: 1 orally once a day (10 Jul 2023 16:15)  metFORMIN 500 mg oral tablet: 1 tab(s) orally 2 times a day (10 Jul 2023 16:15)  NIFEdipine 60 mg oral tablet, extended release: 1 tab(s) orally once a day (10 Jul 2023 16:15)  pioglitazone 45 mg oral tablet: 1 tab(s) orally once a day (10 Jul 2023 16:15)      VITALS:   T(F): 97.8 ( @ 05:37), Max: 98.4 (07-10 @ 23:49)  HR: 65 ( @ 05:37) (65 - 95)  BP: 147/68 ( @ 05:37) (134/63 - 182/86)  BP(mean): 100 ( @ 00:51) (91 - 100)  RR: 19 ( @ 05:37) (16 - 20)  SpO2: 96% ( @ 05:37) (96% - 97%)    I&O's Summary    10 Jul 2023 07:01  -  2023 07:00  --------------------------------------------------------  IN: 0 mL / OUT: 1300 mL / NET: -1300 mL        REVIEW OF SYSTEMS:  CONSTITUTIONAL: No weakness, fevers or chills  HEENT: No visual changes, neck/ear pain  RESPIRATORY: No cough, sob  CARDIOVASCULAR: See HPI  GASTROINTESTINAL: No abdominal pain. No nausea, vomiting, diarrhea   NEUROLOGICAL: No new focal deficits  SKIN: No new rashes  LE: Increased swelling    PHYSICAL EXAM:  General: Not in distress.  Non-toxic appearing.   HEENT: EOMI  Neck: JVD & HJR -ve  Cardio: regular, S1, S2, no murmur  Pulm: B/L BS.  No wheezing / crackles / rales  Abdomen: Soft, non-tender, non-distended. Normoactive bowel sounds  Extremities: 1+ edema b/l le  Neuro: A&O x3. moving all extremities, no slurred speech    LABS:                        14.0   6.86  )-----------( 287      ( 2023 06:50 )             43.4         138  |  100  |  18  ----------------------------<  126<H>  4.1   |  24  |  1.2    Ca    9.2      2023 06:50  Mg     2.2         TPro  6.4  /  Alb  3.6  /  TBili  0.7  /  DBili  x   /  AST  18  /  ALT  9   /  AlkPhos  100        Troponin T, Serum: <0.01 ng/mL (07-10-23 @ 13:26)    CARDIAC MARKERS ( 10 Jul 2023 13:26 )  x     / <0.01 ng/mL / x     / x     / x            Troponin trend:       Chol 174 LDL -- HDL 92 Trig 65      RADIOLOGY:  -CXR: b/l interstitial infiltrates 7/10/23  -TTE: 3/17/23 EF 63%, normal LA size, PHTN 46, IVC within normal limits and compressible, no valvulopathy (PV not visualized)  -CCTA:  -STRESS TEST:  -CATHETERIZATION:  -OTHER:  EC Lead ECG:   Ventricular Rate 65 BPM    Atrial Rate 65 BPM    P-R Interval 152 ms    QRS Duration 88 ms    Q-T Interval 408 ms    QTC Calculation(Bazett) 424 ms    P Axis 68 degrees    R Axis 78 degrees    T Axis 50 degrees    Diagnosis Line Normal sinus rhythm  Possible Left atrial enlargement  Left ventricular hypertrophy ( Sokolow-Jackman )  Borderline ECG    Confirmed by Narinder Olivera ([id number]) on 7/10/2023 2:57:14 PM (07-10 @ 14:27)      TELEMETRY EVENTS: none   Outpt cardiologist: Dr Miller    HPI:  77 year old female with past medical history of HTN, DM, Asthma, renal cancer S/P left nephrectomy, HFpEF?. She was doing relatively well until 2 weeks ago when she started noticing progressive lower limb edema started from ankles and going up to thighs. It is associated with dyspnea on exertion with left flank dull pain. There is no chest pain, no palpitations, no orthopnea, or paroxysmal nocturnal dyspnea. She is compliant with her medications, no change in diet, no extra salt intake. No fever or chills, no arthralgia. No change in urine amount, color or consistency.     She was admitted in January due to lower limb edema to [facility], treated with diuretics and discharged home without final diagnosis. Followed with her cardiologist in March and he increased her Lasix dose.     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 Recent hospitalization at [facility] w/ no clear diagnosis. Patient followed up w/ Dr Miller, TTE was done which showed normal EF & no valvulopathy.    Patient reports no response to Lasix as outpatient. 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)      ALLERGIES:  No Known Drug Allergies  peanuts (Anaphylaxis)      MEDICATIONS:  budesonide  80 MICROgram(s)/formoterol 4.5 MICROgram(s) Inhaler 2 Puff(s) Inhalation two times a day  enoxaparin Injectable 40 milliGRAM(s) SubCutaneous every 24 hours  furosemide   Injectable 40 milliGRAM(s) IV Push two times a day  NIFEdipine XL 60 milliGRAM(s) Oral daily    PRN:  albuterol    90 MICROgram(s) HFA Inhaler 2 Puff(s) Inhalation every 6 hours PRN      HOME MEDICATIONS:  Home Medications:  Albuterol (Eqv-ProAir HFA) 90 mcg/inh inhalation aerosol: 2 puff(s) inhaled every 6 hours as needed for  bronchospasm (10 Jul 2023 16:15)  fluticasone-vilanterol 100 mcg-25 mcg/inh inhalation powder: 1 puff(s) inhaled once a day (10 Jul 2023 16:15)  furosemide 40 mg oral tablet: 1 orally once a day (10 Jul 2023 16:15)  metFORMIN 500 mg oral tablet: 1 tab(s) orally 2 times a day (10 Jul 2023 16:15)  NIFEdipine 60 mg oral tablet, extended release: 1 tab(s) orally once a day (10 Jul 2023 16:15)  pioglitazone 45 mg oral tablet: 1 tab(s) orally once a day (10 Jul 2023 16:15)      VITALS:   T(F): 97.8 (07-11 @ 05:37), Max: 98.4 (07-10 @ 23:49)  HR: 65 (07-11 @ 05:37) (65 - 95)  BP: 147/68 (07-11 @ 05:37) (134/63 - 182/86)  BP(mean): 100 (07-11 @ 00:51) (91 - 100)  RR: 19 (07-11 @ 05:37) (16 - 20)  SpO2: 96% (07-11 @ 05:37) (96% - 97%)    I&O's Summary    10 Jul 2023 07:01  -  11 Jul 2023 07:00  --------------------------------------------------------  IN: 0 mL / OUT: 1300 mL / NET: -1300 mL        REVIEW OF SYSTEMS:  CONSTITUTIONAL: No fever, weight loss, fatigue  NECK: No pain or stiffness  RESPIRATORY: See HPI  CARDIOVASCULAR: See HPI  GASTROINTESTINAL: No abdominal/epigastric pain, nausea, vomiting, hematemesis, diarrhea, constipation, melena or hematochezia  GENITOURINARY: No dysuria, frequency, hematuria, incontinence  NEUROLOGICAL: No headaches, memory loss, loss of strength, numbness, tremors  SKIN: No itching, burning, rashes, lesions   ENDOCRINE: No heat/cold intolerance or hair loss  MUSCULOSKELETAL: No joint pain or swelling  HEME/LYMPH: No easy bruising or bleeding gums    PHYSICAL EXAM:  General: Not in distress.  Non-toxic appearing.   HEENT: EOMI  Cardio: regular, S1, S2, no murmur  Pulm: B/L BS.  No wheezing / crackles / rales  Abdomen: Soft, non-tender, non-distended  Extremities: 1+ edema b/l le  Neuro: A&O x3. moving all extremities, no slurred speech      LABS:                        14.0   6.86  )-----------( 287      ( 11 Jul 2023 06:50 )             43.4     07-11    138  |  100  |  18  ----------------------------<  126<H>  4.1   |  24  |  1.2    Ca    9.2      11 Jul 2023 06:50  Mg     2.2     07-11    TPro  6.4  /  Alb  3.6  /  TBili  0.7  /  DBili  x   /  AST  18  /  ALT  9   /  AlkPhos  100  07-11      Troponin T, Serum: <0.01 ng/mL (07-10-23 @ 13:26)        RADIOLOGY:  -CXR: b/l interstitial infiltrates 7/10/23  -TTE: 3/17/23 EF 63%, normal LA size, PHTN 46, IVC within normal limits and compressible, no valvulopathy (PV not visualized)      12 Lead ECG:   Ventricular Rate 65 BPM    Atrial Rate 65 BPM    P-R Interval 152 ms    QRS Duration 88 ms    Q-T Interval 408 ms    QTC Calculation(Bazett) 424 ms    P Axis 68 degrees    R Axis 78 degrees    T Axis 50 degrees    Diagnosis Line Normal sinus rhythm  Possible Left atrial enlargement  Left ventricular hypertrophy ( Sokolow-Jackman )  Borderline ECG    Confirmed by Narnider Olivera ([id number]) on 7/10/2023 2:57:14 PM (07-10 @ 14:27)      TELEMETRY EVENTS: none

## 2023-07-11 NOTE — PATIENT PROFILE ADULT - FALL HARM RISK - HARM RISK INTERVENTIONS

## 2023-07-11 NOTE — PROGRESS NOTE ADULT - SUBJECTIVE AND OBJECTIVE BOX
24H events:    Patient is a 77y old Female who presents with a chief complaint of lower limb edema (11 Jul 2023 09:41)    Primary diagnosis of Fluid overload       Today is hospital day 1d. This morning patient was seen and examined at bedside, resting comfortably in bed.      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 1000 milliLiter(s) IV Continuous <Continuous>  dextrose 5%. 1000 milliLiter(s) IV Continuous <Continuous>  dextrose 50% Injectable 25 Gram(s) IV Push once  dextrose 50% Injectable 12.5 Gram(s) IV Push once  dextrose 50% Injectable 25 Gram(s) IV Push once  enoxaparin Injectable 40 milliGRAM(s) SubCutaneous every 24 hours  glucagon  Injectable 1 milliGRAM(s) IntraMuscular once  insulin lispro (ADMELOG) corrective regimen sliding scale   SubCutaneous three times a day before meals  NIFEdipine XL 60 milliGRAM(s) Oral daily    PRN MEDICATIONS  albuterol    90 MICROgram(s) HFA Inhaler 2 Puff(s) Inhalation every 6 hours PRN  dextrose Oral Gel 15 Gram(s) Oral once PRN    VITALS:   T(F): 97.6  HR: 69  BP: 131/62  RR: 18  SpO2: 96%    PHYSICAL EXAM:  GENERAL: NAD, well-groomed, well-developed  HEAD:  Atraumatic, Normocephalic  EYES: EOMI  NECK: Supple  NERVOUS SYSTEM:  Alert & Oriented X3, non focal   CHEST/LUNG: Clear to auscultation bilaterally; No rales, rhonchi, wheezing, or rubs  HEART: Regular rate and rhythm; No murmurs, rubs, or gallops  ABDOMEN: Soft, Nontender, Nondistended; Bowel sounds present  EXTREMITIES:  2+ Peripheral Pulses, No clubbing, cyanosis, or edema  LYMPH: No lymphadenopathy noted  SKIN: No rashes or lesions    LABS:                        14.0   6.86  )-----------( 287      ( 11 Jul 2023 06:50 )             43.4     07-11    138  |  100  |  18  ----------------------------<  126<H>  4.1   |  24  |  1.2    Ca    9.2      11 Jul 2023 06:50  Mg     2.2     07-11    TPro  6.4  /  Alb  3.6  /  TBili  0.7  /  DBili  x   /  AST  18  /  ALT  9   /  AlkPhos  100  07-11      Urinalysis Basic - ( 11 Jul 2023 06:50 )    Color: x / Appearance: x / SG: x / pH: x  Gluc: 126 mg/dL / Ketone: x  / Bili: x / Urobili: x   Blood: x / Protein: x / Nitrite: x   Leuk Esterase: x / RBC: x / WBC x   Sq Epi: x / Non Sq Epi: x / Bacteria: x            CARDIAC MARKERS ( 10 Jul 2023 13:26 )  x     / <0.01 ng/mL / x     / x     / x          RADIOLOGY:

## 2023-07-11 NOTE — CONSULT NOTE ADULT - ASSESSMENT
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    # Suspected HFpEF Exacerbation  Recent hospitalization at [facility] w/ no clear diagnosis  Patient followed up w/ Dr Miller, TTE was done outpatient which showed normal EF & no valvulopathy, E/e' < 9, PHTN 46 (severe), normal IVC at the time, no valvulopathy  ECG shows LA enlargement & LVH, but not seen on echo  TSH 2.63, FT4 3 (wnl)  Urine Alb:Cr 70 (microalbuminuria)  albumin 3.6  %Iron 28  - can check Urine Protein:Creatinine ratio  - Can check Duplex LE for venous insufficiency (unsure if it can be done inpatient)  H2FpEF score 5 (BMI, TN, PHTN, Age)  Pro- (low), but patient is obese so does not r/o HFpEF  - Will discuss w/ attending benefit of [facility]HC for eval of HFpEF (borderline H2FpEF score) & PHTN  - Maintain negative net balance  - Accurate I/O, Daily weight, fluid restriction  - c/w Lasix 40 IV bid  - BP control as per primary *** Pending finalization w/ attending    77-year-old w/ DM a1c 6%, HTN, 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    # Suspected HFpEF Exacerbation  Recent hospitalization at [facility] w/ no clear diagnosis  Patient followed up w/ Dr Miller, TTE was done outpatient which showed normal EF & no valvulopathy, E/e' < 9, PHTN 46 (severe), normal IVC at the time, no valvulopathy  ECG shows LA enlargement & LVH, but not seen on echo  TSH 2.63, FT4 3 (wnl)  Urine Alb:Cr 70 (microalbuminuria)  albumin 3.6  %Iron 28  - can check Urine Protein:Creatinine ratio  - Can check Duplex LE for venous insufficiency (unsure if it can be done inpatient)  H2FpEF score 5 (BMI, TN, PHTN, Age)  Pro- (low), but patient is obese so does not r/o HFpEF  - Will discuss w/ attending benefit of RHC for eval of HFpEF (borderline H2FpEF score) & PHTN  - Maintain negative net balance  - Accurate I/O, Daily weight, fluid restriction  - c/w Lasix 40 PO QD, extra Lasix dose to be given if > 2 lb weight gain/day or > 5 lb weight gain/week  - BP control as per primary *** Pending finalization w/ attending    77-year-old w/ DM a1c 6%, HTN, 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    # Suspected HFpEF Exacerbation  Recent hospitalization at [facility] w/ no clear diagnosis  Patient followed up w/ Dr Miller, TTE was done outpatient which showed normal EF & no valvulopathy, E/e' < 9, PHTN 46 (severe), normal IVC at the time, no valvulopathy  ECG shows LA enlargement & LVH, but not seen on echo  TSH 2.63, FT4 3 (wnl)  Urine Alb:Cr 70 (microalbuminuria)  albumin 3.6  %Iron 28  - can check Urine Protein:Creatinine ratio  - Can check Duplex LE for venous insufficiency (unsure if it can be done inpatient) [unlikely given good response to Lasix]  H2FpEF score 5 (BMI, TN, PHTN, Age)  Pro- (low), but patient is obese so does not r/o HFpEF  - Will discuss w/ attending benefit of RHC for eval of HFpEF (borderline H2FpEF score) & PHTN  - Maintain negative net balance  - Accurate I/O, Daily weight, fluid restriction  - c/w Lasix 40 PO QD, extra Lasix dose to be given if > 2 lb weight gain/day or > 5 lb weight gain/week  - BP control as per primary 77-year-old w/ DM a1c 6%, HTN, 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    # Suspected HFpEF Exacerbation  Recent hospitalization at [facility] w/ no clear diagnosis  Patient followed up w/ Dr Miller, TTE was done outpatient which showed normal EF & no valvulopathy, E/e' < 9, PHTN 46 (severe), normal IVC at the time, no valvulopathy  ECG shows LA enlargement & LVH, but not seen on echo  TSH 2.63, FT4 3 (wnl)  Urine Alb:Cr 70 (microalbuminuria)  albumin 3.6  %Iron 28  - can check Urine Protein:Creatinine ratio  - Can check Duplex LE for venous insufficiency (unsure if it can be done inpatient) [unlikely given good response to Lasix]  H2FpEF score 5 (BMI, TN, PHTN, Age)  Pro- (low), but patient is obese so does not r/o HFpEF  - Maintain negative net balance  - Accurate I/O, Daily weight, fluid restriction  - c/w Lasix 40 PO QD, extra Lasix dose to be given if > 2 lb weight gain/day or > 5 lb weight gain/week  - BP control as per primary 77-year-old w/ DM a1c 6%, HTN, 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    # Acute HFpEF  Recent hospitalization at [facility] w/ no clear diagnosis  Patient followed up w/ Dr Miller, TTE was done outpatient which showed normal EF & no valvulopathy, E/e' < 9, PHTN 46 (severe), normal IVC at the time, no valvulopathy  H2FpEF score 5 (BMI, TN, PHTN, Age)  Pro- (low), but patient is obese so does not r/o HFpEF    - c/w Lasix 40 PO QD, extra Lasix dose to be given if > 2 lb weight gain/day or > 5 lb weight gain/week  - Maintain negative net balance  - Accurate I/O, Daily weight, fluid restriction  - BP control as per primary team

## 2023-07-11 NOTE — CONSULT NOTE ADULT - ATTENDING COMMENTS
Acute on Chronic HFpEF  Mild Pulmonary HTN    LE edema significantly improved    Continue Lasix 40 mg PO daily + an extra 40 mg PRN edema  Outpatient follow up with Dr. Miller  Recall PRN

## 2023-07-11 NOTE — PROGRESS NOTE ADULT - ASSESSMENT
77 year old female with past medical history of HTN, DM, Asthma, renal cancer S/P left nephrectomy, HFpEF. She was doing relatively well until 2 weeks ago when she started noticing progressive lower limb edema started from ankles and going up to thighs associated with MENDEZ.    Acute HFpEF  DM-2 / HTN  H/O Asthma  H/O Renal cell carcinoma S/P L Nephrectomy  Morbid obesity  Severe pulmonary HTN               PLAN:    ·	Tele reviewed. No events  ·	CXR showed b/l interstitial/vascular prominence  ·	Pro BNP is 171. Low likely due to obesity. Cannot r/o CHF  ·	Outside ECHO showed severe pulmonary HTN and normal ECHO  ·	Will repeat ECHO  ·	S/P IV Lasix. O/E pt is euvolemic. Will switch her to PO Lasix 40 mg daily  ·	Check i's and o's and daily wt  ·	Low salt diet and water restriction to 1.5 L/D  ·	Cardiology eval  ·	Monitor FS. On Insulin sliding scale    Progress Note Handoff    Pending (specify):  Consults_Cardiology________, Tests__ECHO______, Test Results_______, Other_________  Family discussion:  Disposition: Home___/SNF___/Other________/Unknown at this time________    Daniel Elizabeth MD  Spectra: [id number]

## 2023-07-11 NOTE — PROGRESS NOTE ADULT - SUBJECTIVE AND OBJECTIVE BOX
RENITA DEWEY  77y Female    CHIEF COMPLAINT:    Patient is a 77y old  Female who presents with a chief complaint of lower limb edema (11 Jul 2023 15:21)      INTERVAL HPI/OVERNIGHT EVENTS:    Patient seen and examined. Feels better today. No sob. No cp. No palpitations.     ROS: All other systems are negative.    Vital Signs:    T(F): 97.6 (07-11-23 @ 13:14), Max: 98.4 (07-10-23 @ 23:49)  HR: 69 (07-11-23 @ 13:14) (65 - 89)  BP: 131/62 (07-11-23 @ 13:14) (131/62 - 156/70)  RR: 18 (07-11-23 @ 13:14) (18 - 19)  SpO2: 96% (07-11-23 @ 05:37) (96% - 97%)  I&O's Summary    10 Jul 2023 07:01  -  11 Jul 2023 07:00  --------------------------------------------------------  IN: 0 mL / OUT: 1300 mL / NET: -1300 mL    11 Jul 2023 07:01  -  11 Jul 2023 16:29  --------------------------------------------------------  IN: 480 mL / OUT: 300 mL / NET: 180 mL      Daily     Daily   CAPILLARY BLOOD GLUCOSE      POCT Blood Glucose.: 148 mg/dL (11 Jul 2023 16:27)  POCT Blood Glucose.: 201 mg/dL (11 Jul 2023 11:19)  POCT Blood Glucose.: 123 mg/dL (11 Jul 2023 08:11)  POCT Blood Glucose.: 133 mg/dL (10 Jul 2023 21:02)      PHYSICAL EXAM:    GENERAL:  NAD  SKIN: No rashes or lesions  HENT: Atraumatic. Normocephalic. PERRL. Moist membranes.  NECK: Supple, No JVD. No lymphadenopathy.  PULMONARY: CTA B/L. No wheezing. No rales  CVS: Normal S1, S2. Rate and Rhythm are regular. No murmurs.  ABDOMEN/GI: Soft, Nontender, Nondistended; BS present  EXTREMITIES: Peripheral pulses intact. No edema B/L LE.  NEUROLOGIC:  No motor or sensory deficit.  PSYCH: Alert & oriented x 3    Consultant(s) Notes Reviewed:  [x ] YES  [ ] NO  Care Discussed with Consultants/Other Providers [ x] YES  [ ] NO    EKG reviewed  Telemetry reviewed    LABS:                        14.0   6.86  )-----------( 287      ( 11 Jul 2023 06:50 )             43.4     07-11    138  |  100  |  18  ----------------------------<  126<H>  4.1   |  24  |  1.2    Ca    9.2      11 Jul 2023 06:50  Mg     2.2     07-11    TPro  6.4  /  Alb  3.6  /  TBili  0.7  /  DBili  x   /  AST  18  /  ALT  9   /  AlkPhos  100  07-11        Trop <0.01, CKMB --, CK --, 07-10-23 @ 13:26        RADIOLOGY & ADDITIONAL TESTS:    < from: Xray Chest 1 View- PORTABLE-Urgent (Xray Chest 1 View- PORTABLE-Urgent .) (07.10.23 @ 13:52) >  IMPRESSION:    Bilateral interstitial/vascular prominence could reflect edema in the   appropriate clinical setting. No pneumothorax.    Stable cardiomediastinal silhouette.    Unchanged osseous structures.    < end of copied text >    Imaging or report Personally Reviewed:  [x ] YES  [ ] NO    Medications:  Standing  budesonide  80 MICROgram(s)/formoterol 4.5 MICROgram(s) Inhaler 2 Puff(s) Inhalation two times a day  dextrose 5%. 1000 milliLiter(s) IV Continuous <Continuous>  dextrose 5%. 1000 milliLiter(s) IV Continuous <Continuous>  dextrose 50% Injectable 25 Gram(s) IV Push once  dextrose 50% Injectable 12.5 Gram(s) IV Push once  dextrose 50% Injectable 25 Gram(s) IV Push once  enoxaparin Injectable 40 milliGRAM(s) SubCutaneous every 24 hours  glucagon  Injectable 1 milliGRAM(s) IntraMuscular once  insulin lispro (ADMELOG) corrective regimen sliding scale   SubCutaneous three times a day before meals  NIFEdipine XL 60 milliGRAM(s) Oral daily    PRN Meds  albuterol    90 MICROgram(s) HFA Inhaler 2 Puff(s) Inhalation every 6 hours PRN  dextrose Oral Gel 15 Gram(s) Oral once PRN      Case discussed with resident    Care discussed with pt/family

## 2023-07-12 ENCOUNTER — TRANSCRIPTION ENCOUNTER (OUTPATIENT)
Age: 78
End: 2023-07-12

## 2023-07-12 LAB
ANION GAP SERPL CALC-SCNC: 15 MMOL/L — HIGH (ref 7–14)
BUN SERPL-MCNC: 34 MG/DL — HIGH (ref 10–20)
CALCIUM SERPL-MCNC: 9.1 MG/DL — SIGNIFICANT CHANGE UP (ref 8.4–10.5)
CHLORIDE SERPL-SCNC: 104 MMOL/L — SIGNIFICANT CHANGE UP (ref 98–110)
CO2 SERPL-SCNC: 24 MMOL/L — SIGNIFICANT CHANGE UP (ref 17–32)
CREAT SERPL-MCNC: 1.4 MG/DL — SIGNIFICANT CHANGE UP (ref 0.7–1.5)
EGFR: 39 ML/MIN/1.73M2 — LOW
GLUCOSE BLDC GLUCOMTR-MCNC: 136 MG/DL — HIGH (ref 70–99)
GLUCOSE BLDC GLUCOMTR-MCNC: 142 MG/DL — HIGH (ref 70–99)
GLUCOSE BLDC GLUCOMTR-MCNC: 226 MG/DL — HIGH (ref 70–99)
GLUCOSE SERPL-MCNC: 155 MG/DL — HIGH (ref 70–99)
HCT VFR BLD CALC: 41.4 % — SIGNIFICANT CHANGE UP (ref 37–47)
HGB BLD-MCNC: 13.2 G/DL — SIGNIFICANT CHANGE UP (ref 12–16)
MAGNESIUM SERPL-MCNC: 2.1 MG/DL — SIGNIFICANT CHANGE UP (ref 1.8–2.4)
MCHC RBC-ENTMCNC: 30 PG — SIGNIFICANT CHANGE UP (ref 27–31)
MCHC RBC-ENTMCNC: 31.9 G/DL — LOW (ref 32–37)
MCV RBC AUTO: 94.1 FL — SIGNIFICANT CHANGE UP (ref 81–99)
NRBC # BLD: 0 /100 WBCS — SIGNIFICANT CHANGE UP (ref 0–0)
PLATELET # BLD AUTO: 282 K/UL — SIGNIFICANT CHANGE UP (ref 130–400)
PMV BLD: 9.2 FL — SIGNIFICANT CHANGE UP (ref 7.4–10.4)
POTASSIUM SERPL-MCNC: 3.8 MMOL/L — SIGNIFICANT CHANGE UP (ref 3.5–5)
POTASSIUM SERPL-SCNC: 3.8 MMOL/L — SIGNIFICANT CHANGE UP (ref 3.5–5)
RBC # BLD: 4.4 M/UL — SIGNIFICANT CHANGE UP (ref 4.2–5.4)
RBC # FLD: 13.2 % — SIGNIFICANT CHANGE UP (ref 11.5–14.5)
SODIUM SERPL-SCNC: 143 MMOL/L — SIGNIFICANT CHANGE UP (ref 135–146)
WBC # BLD: 6.56 K/UL — SIGNIFICANT CHANGE UP (ref 4.8–10.8)
WBC # FLD AUTO: 6.56 K/UL — SIGNIFICANT CHANGE UP (ref 4.8–10.8)

## 2023-07-12 PROCEDURE — 93306 TTE W/DOPPLER COMPLETE: CPT | Mod: 26

## 2023-07-12 PROCEDURE — 93970 EXTREMITY STUDY: CPT | Mod: 26

## 2023-07-12 PROCEDURE — 99232 SBSQ HOSP IP/OBS MODERATE 35: CPT

## 2023-07-12 RX ADMIN — Medication 40 MILLIGRAM(S): at 05:30

## 2023-07-12 RX ADMIN — Medication 60 MILLIGRAM(S): at 05:30

## 2023-07-12 RX ADMIN — Medication 4: at 11:24

## 2023-07-12 NOTE — DISCHARGE NOTE PROVIDER - NSDCFUADDINST_GEN_ALL_CORE_FT
Please follow up with your PMD in one week.  Please follow up with Dr. Miller in one week.   Please follow up with your PMD in one week.

## 2023-07-12 NOTE — PROGRESS NOTE ADULT - SUBJECTIVE AND OBJECTIVE BOX
RENITA DEWEY  77y Female    CHIEF COMPLAINT:    Patient is a 77y old  Female who presents with a chief complaint of lower limb edema (2023 11:54)      INTERVAL HPI/OVERNIGHT EVENTS:    Patient seen and examined.    ROS: All other systems are negative.    Vital Signs:    T(F): 97 (23 @ 14:05), Max: 97.1 (23 @ 05:04)  HR: 71 (23 @ 14:05) (62 - 78)  BP: 145/70 (23 @ 14:05) (117/55 - 154/72)  RR: 18 (23 @ 14:05) (18 - 18)  SpO2: 98% (23 @ 05:04) (98% - 98%)  I&O's Summary    2023 07:01  -  2023 07:00  --------------------------------------------------------  IN: 480 mL / OUT: 900 mL / NET: -420 mL    2023 07:01  -  2023 14:52  --------------------------------------------------------  IN: 1336 mL / OUT: 850 mL / NET: 486 mL      Daily     Daily Weight in k.2 (2023 05:04)  CAPILLARY BLOOD GLUCOSE      POCT Blood Glucose.: 226 mg/dL (2023 10:57)  POCT Blood Glucose.: 185 mg/dL (2023 21:30)  POCT Blood Glucose.: 148 mg/dL (2023 16:27)      PHYSICAL EXAM:    GENERAL:  NAD  SKIN: No rashes or lesions  HENT: Atraumatic. Normocephalic. PERRL. Moist membranes.  NECK: Supple, No JVD. No lymphadenopathy.  PULMONARY: CTA B/L. No wheezing. No rales  CVS: Normal S1, S2. Rate and Rhythm are regular. No murmurs.  ABDOMEN/GI: Soft, Nontender, Nondistended; BS present  EXTREMITIES: Peripheral pulses intact. No edema B/L LE.  NEUROLOGIC:  No motor or sensory deficit.  PSYCH: Alert & oriented x 3    Consultant(s) Notes Reviewed:  [x ] YES  [ ] NO  Care Discussed with Consultants/Other Providers [ x] YES  [ ] NO    EKG reviewed  Telemetry reviewed    LABS:                        13.2   6.56  )-----------( 282      ( 2023 06:05 )             41.4     -    143  |  104  |  34<H>  ----------------------------<  155<H>  3.8   |  24  |  1.4    Ca    9.1      2023 06:05  Mg     2.1         TPro  6.4  /  Alb  3.6  /  TBili  0.7  /  DBili  x   /  AST  18  /  ALT  9   /  AlkPhos  100          Trop <0.01, CKMB --, CK --, 07-10-23 @ 13:26        RADIOLOGY & ADDITIONAL TESTS:      Imaging or report Personally Reviewed:  [x ] YES  [ ] NO    Medications:  Standing  budesonide  80 MICROgram(s)/formoterol 4.5 MICROgram(s) Inhaler 2 Puff(s) Inhalation two times a day  dextrose 5%. 1000 milliLiter(s) IV Continuous <Continuous>  dextrose 5%. 1000 milliLiter(s) IV Continuous <Continuous>  dextrose 50% Injectable 12.5 Gram(s) IV Push once  dextrose 50% Injectable 25 Gram(s) IV Push once  dextrose 50% Injectable 25 Gram(s) IV Push once  enoxaparin Injectable 40 milliGRAM(s) SubCutaneous every 24 hours  glucagon  Injectable 1 milliGRAM(s) IntraMuscular once  insulin lispro (ADMELOG) corrective regimen sliding scale   SubCutaneous three times a day before meals  NIFEdipine XL 60 milliGRAM(s) Oral daily    PRN Meds  albuterol    90 MICROgram(s) HFA Inhaler 2 Puff(s) Inhalation every 6 hours PRN  dextrose Oral Gel 15 Gram(s) Oral once PRN      Case discussed with resident    Care discussed with pt/family           RENITA DEWEY  77y Female    CHIEF COMPLAINT:    Patient is a 77y old  Female who presents with a chief complaint of lower limb edema (2023 11:54)      INTERVAL HPI/OVERNIGHT EVENTS:    Patient seen and examined. Feels good. No sob. No leg swelling    ROS: All other systems are negative.    Vital Signs:    T(F): 97 (23 @ 14:05), Max: 97.1 (23 @ 05:04)  HR: 71 (23 @ 14:05) (62 - 78)  BP: 145/70 (23 @ 14:05) (117/55 - 154/72)  RR: 18 (23 @ 14:05) (18 - 18)  SpO2: 98% (23 @ 05:04) (98% - 98%)  I&O's Summary    2023 07:  -  2023 07:00  --------------------------------------------------------  IN: 480 mL / OUT: 900 mL / NET: -420 mL    2023 07:01  -  2023 14:52  --------------------------------------------------------  IN: 1336 mL / OUT: 850 mL / NET: 486 mL      Daily     Daily Weight in k.2 (2023 05:04)  CAPILLARY BLOOD GLUCOSE      POCT Blood Glucose.: 226 mg/dL (2023 10:57)  POCT Blood Glucose.: 185 mg/dL (2023 21:30)  POCT Blood Glucose.: 148 mg/dL (2023 16:27)      PHYSICAL EXAM:    GENERAL:  NAD  SKIN: No rashes or lesions  HENT: Atraumatic. Normocephalic. PERRL. Moist membranes.  NECK: Supple, No JVD. No lymphadenopathy.  PULMONARY: CTA B/L. No wheezing. No rales  CVS: Normal S1, S2. Rate and Rhythm are regular. No murmurs.  ABDOMEN/GI: Soft, Nontender, Nondistended; BS present  EXTREMITIES: Peripheral pulses intact. No edema B/L LE.  NEUROLOGIC:  No motor or sensory deficit.  PSYCH: Alert & oriented x 3    Consultant(s) Notes Reviewed:  [x ] YES  [ ] NO  Care Discussed with Consultants/Other Providers [ x] YES  [ ] NO    EKG reviewed  Telemetry reviewed    LABS:                        13.2   6.56  )-----------( 282      ( 2023 06:05 )             41.4     07-    143  |  104  |  34<H>  ----------------------------<  155<H>    Creatinine Trend: 1.4<--, 1.2<--, 1.2<--  3.8   |  24  |  1.4    Ca    9.1      2023 06:05  Mg     2.1         TPro  6.4  /  Alb  3.6  /  TBili  0.7  /  DBili  x   /  AST  18  /  ALT  9   /  AlkPhos  100  -        Trop <0.01, CKMB --, CK --, 07-10-23 @ 13:26        RADIOLOGY & ADDITIONAL TESTS:      Imaging or report Personally Reviewed:  [x ] YES  [ ] NO    Medications:  Standing  budesonide  80 MICROgram(s)/formoterol 4.5 MICROgram(s) Inhaler 2 Puff(s) Inhalation two times a day  dextrose 5%. 1000 milliLiter(s) IV Continuous <Continuous>  dextrose 5%. 1000 milliLiter(s) IV Continuous <Continuous>  dextrose 50% Injectable 12.5 Gram(s) IV Push once  dextrose 50% Injectable 25 Gram(s) IV Push once  dextrose 50% Injectable 25 Gram(s) IV Push once  enoxaparin Injectable 40 milliGRAM(s) SubCutaneous every 24 hours  glucagon  Injectable 1 milliGRAM(s) IntraMuscular once  insulin lispro (ADMELOG) corrective regimen sliding scale   SubCutaneous three times a day before meals  NIFEdipine XL 60 milliGRAM(s) Oral daily    PRN Meds  albuterol    90 MICROgram(s) HFA Inhaler 2 Puff(s) Inhalation every 6 hours PRN  dextrose Oral Gel 15 Gram(s) Oral once PRN      Case discussed with resident    Care discussed with pt/family           RENITA DEWEY  77y Female    CHIEF COMPLAINT:    Patient is a 77y old  Female who presents with a chief complaint of lower limb edema (2023 11:54)      INTERVAL HPI/OVERNIGHT EVENTS:    Patient seen and examined. Feels good. No sob. No leg swelling    ROS: All other systems are negative.    Vital Signs:    T(F): 97 (23 @ 14:05), Max: 97.1 (23 @ 05:04)  HR: 71 (23 @ 14:05) (62 - 78)  BP: 145/70 (23 @ 14:05) (117/55 - 154/72)  RR: 18 (23 @ 14:05) (18 - 18)  SpO2: 98% (23 @ 05:04) (98% - 98%)  I&O's Summary    2023 07:  -  2023 07:00  --------------------------------------------------------  IN: 480 mL / OUT: 900 mL / NET: -420 mL    2023 07:01  -  2023 14:52  --------------------------------------------------------  IN: 1336 mL / OUT: 850 mL / NET: 486 mL      Daily     Daily Weight in k.2 (2023 05:04)  CAPILLARY BLOOD GLUCOSE      POCT Blood Glucose.: 226 mg/dL (2023 10:57)  POCT Blood Glucose.: 185 mg/dL (2023 21:30)  POCT Blood Glucose.: 148 mg/dL (2023 16:27)      PHYSICAL EXAM:    GENERAL:  NAD  SKIN: No rashes or lesions  HENT: Atraumatic. Normocephalic. PERRL. Moist membranes.  NECK: Supple, No JVD. No lymphadenopathy.  PULMONARY: CTA B/L. No wheezing. No rales  CVS: Normal S1, S2. Rate and Rhythm are regular. No murmurs.  ABDOMEN/GI: Soft, Nontender, Nondistended; BS present  EXTREMITIES: Peripheral pulses intact. No edema B/L LE.  NEUROLOGIC:  No motor or sensory deficit.  PSYCH: Alert & oriented x 3    Consultant(s) Notes Reviewed:  [x ] YES  [ ] NO  Care Discussed with Consultants/Other Providers [ x] YES  [ ] NO    EKG reviewed  Telemetry reviewed    LABS:                        13.2   6.56  )-----------( 282      ( 2023 06:05 )             41.4     07-    143  |  104  |  34<H>  ----------------------------<  155<H>    Creatinine Trend: 1.4<--, 1.2<--, 1.2<--  3.8   |  24  |  1.4    Ca    9.1      2023 06:05  Mg     2.1         TPro  6.4  /  Alb  3.6  /  TBili  0.7  /  DBili  x   /  AST  18  /  ALT  9   /  AlkPhos  100  07-11        Trop <0.01, CKMB --, CK --, 07-10-23 @ 13:26        RADIOLOGY & ADDITIONAL TESTS:    < from: TTE Echo Complete w/ Contrast w/ Doppler (23 @ 11:18) >    Summary:   1. Normal global left ventricular systolic function.   2. LV Ejection Fraction by Conklin's Method with a biplane EF of 61 %.   3. Spectral Doppler shows impaired relaxation pattern of left   ventricular myocardial filling (Grade I diastolic dysfunction).   4. Normal right ventricular size and function.   5. Normal left atrial size.   6. Normal right atrial size.   7. Trace mitral valve regurgitation.   8. Mild tricuspid regurgitation.   9. Estimated pulmonary artery systolic pressureis 39.4 mmHg assuming a   right atrial pressure of 8 mmHg, which is consistent with borderline   pulmonary hypertension.  10. Endocardial visualization was enhanced with intravenous echo contrast.    < end of copied text >    Imaging or report Personally Reviewed:  [x ] YES  [ ] NO    Medications:  Standing  budesonide  80 MICROgram(s)/formoterol 4.5 MICROgram(s) Inhaler 2 Puff(s) Inhalation two times a day  dextrose 5%. 1000 milliLiter(s) IV Continuous <Continuous>  dextrose 5%. 1000 milliLiter(s) IV Continuous <Continuous>  dextrose 50% Injectable 12.5 Gram(s) IV Push once  dextrose 50% Injectable 25 Gram(s) IV Push once  dextrose 50% Injectable 25 Gram(s) IV Push once  enoxaparin Injectable 40 milliGRAM(s) SubCutaneous every 24 hours  glucagon  Injectable 1 milliGRAM(s) IntraMuscular once  insulin lispro (ADMELOG) corrective regimen sliding scale   SubCutaneous three times a day before meals  NIFEdipine XL 60 milliGRAM(s) Oral daily    PRN Meds  albuterol    90 MICROgram(s) HFA Inhaler 2 Puff(s) Inhalation every 6 hours PRN  dextrose Oral Gel 15 Gram(s) Oral once PRN      Case discussed with resident    Care discussed with pt/family

## 2023-07-12 NOTE — PROGRESS NOTE ADULT - SUBJECTIVE AND OBJECTIVE BOX
24H events:    Patient is a 77y old Female who presents with a chief complaint of lower limb edema (11 Jul 2023 16:28)    Primary diagnosis of Fluid overload       Today is hospital day 2d. This morning patient was seen and examined at bedside, resting comfortably in bed.      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 1000 milliLiter(s) IV Continuous <Continuous>  dextrose 5%. 1000 milliLiter(s) IV Continuous <Continuous>  dextrose 50% Injectable 25 Gram(s) IV Push once  dextrose 50% Injectable 12.5 Gram(s) IV Push once  dextrose 50% Injectable 25 Gram(s) IV Push once  enoxaparin Injectable 40 milliGRAM(s) SubCutaneous every 24 hours  furosemide    Tablet 40 milliGRAM(s) Oral daily  glucagon  Injectable 1 milliGRAM(s) IntraMuscular once  insulin lispro (ADMELOG) corrective regimen sliding scale   SubCutaneous three times a day before meals  NIFEdipine XL 60 milliGRAM(s) Oral daily    PRN MEDICATIONS  albuterol    90 MICROgram(s) HFA Inhaler 2 Puff(s) Inhalation every 6 hours PRN  dextrose Oral Gel 15 Gram(s) Oral once PRN    VITALS:   T(F): 97.1  HR: 62  BP: 117/55  RR: 18  SpO2: 98%    PHYSICAL EXAM:  GENERAL: NAD, well-groomed, well-developed  HEAD:  Atraumatic, Normocephalic  EYES: EOMI  NECK: Supple  NERVOUS SYSTEM:  Alert & Oriented X3, non focal   CHEST/LUNG: Clear to auscultation bilaterally; No rales, rhonchi, wheezing, or rubs  HEART: Regular rate and rhythm; No murmurs, rubs, or gallops  ABDOMEN: Soft, Nontender, Nondistended; Bowel sounds present  EXTREMITIES:  2+ Peripheral Pulses, No clubbing, cyanosis, or edema  LYMPH: No lymphadenopathy noted  SKIN: No rashes or lesions  LABS:                        13.2   6.56  )-----------( 282      ( 12 Jul 2023 06:05 )             41.4     07-12    143  |  104  |  34<H>  ----------------------------<  155<H>  3.8   |  24  |  1.4    Ca    9.1      12 Jul 2023 06:05  Mg     2.1     07-12    TPro  6.4  /  Alb  3.6  /  TBili  0.7  /  DBili  x   /  AST  18  /  ALT  9   /  AlkPhos  100  07-11      Urinalysis Basic - ( 12 Jul 2023 06:05 )    Color: x / Appearance: x / SG: x / pH: x  Gluc: 155 mg/dL / Ketone: x  / Bili: x / Urobili: x   Blood: x / Protein: x / Nitrite: x   Leuk Esterase: x / RBC: x / WBC x   Sq Epi: x / Non Sq Epi: x / Bacteria: x            CARDIAC MARKERS ( 10 Jul 2023 13:26 )  x     / <0.01 ng/mL / x     / x     / x          RADIOLOGY:

## 2023-07-12 NOTE — PHYSICAL THERAPY INITIAL EVALUATION ADULT - GENERAL OBSERVATIONS, REHAB EVAL
10:00-10:40 Pt encountered semi cullen In bed in NAD with +call bell, +bed rails, +bed alarm, +primafit, agreeable to therapy.

## 2023-07-12 NOTE — PROGRESS NOTE ADULT - ASSESSMENT
77 year old female with past medical history of HTN, DM, Asthma, renal cancer S/P left nephrectomy, HFpEF. She was doing relatively well until 2 weeks ago when she started noticing progressive lower limb edema started from ankles and going up to thighs associated with MENDEZ.    Acute HFpEF  DM-2 / HTN  H/O Asthma  H/O Renal cell carcinoma S/P L Nephrectomy  Morbid obesity  Severe pulmonary HTN               PLAN:    ·	Tele reviewed. No events  ·	CXR showed b/l interstitial/vascular prominence  ·	Pro BNP is 171. Low likely due to obesity. Cannot r/o CHF  ·	Outside ECHO showed severe pulmonary HTN and normal ECHO  ·	Will repeat ECHO  ·	S/P IV Lasix. O/E pt is euvolemic. Will switch her to PO Lasix 40 mg daily  ·	Check i's and o's and daily wt  ·	Low salt diet and water restriction to 1.5 L/D  ·	Cardiology eval  ·	Monitor FS. On Insulin sliding scale    Progress Note Handoff    Pending (specify):  Consults_Cardiology________, Tests__ECHO______, Test Results_______, Other_________  Family discussion:  Disposition: Home___/SNF___/Other________/Unknown at this time________    Daniel Elizabeth MD  Spectra: [id number]       77 year old female with past medical history of HTN, DM, Asthma, renal cancer S/P left nephrectomy, HFpEF. She was doing relatively well until 2 weeks ago when she started noticing progressive lower limb edema started from ankles and going up to thighs associated with MENDEZ.    Acute HFpEF  DM-2 / HTN  H/O Asthma  H/O Renal cell carcinoma S/P L Nephrectomy  Morbid obesity  Severe pulmonary HTN               PLAN:    ·	Tele reviewed. No events  ·	CXR showed b/l interstitial/vascular prominence  ·	Pro BNP is 171. Low, likely due to obesity. Cannot r/o CHF  ·	Outside ECHO showed severe pulmonary HTN and normal ECHO  ·	Will repeat ECHO  ·	S/P IV Lasix. O/E pt is euvolemic. Will switch her to PO Lasix 40 mg daily  ·	Check i's and o's and daily wt  ·	Low salt diet and water restriction to 1.5 L/D  ·	Cardiology eval  ·	Monitor FS. On Insulin sliding scale    * Med rec reviewed. Plan of care d/w the pt. Time spent 40 minutes.        77 year old female with past medical history of HTN, DM, Asthma, renal cancer S/P left nephrectomy, HFpEF. She was doing relatively well until 2 weeks ago when she started noticing progressive lower limb edema started from ankles and going up to thighs associated with MENDEZ.    Acute HFpEF  DM-2 / HTN  H/O Asthma  H/O Renal cell carcinoma S/P L Nephrectomy  Morbid obesity  Borderline pulmonary HTN               PLAN:    ·	Tele reviewed. No events  ·	CXR showed b/l interstitial/vascular prominence  ·	Pro BNP is 171. Low, likely due to obesity. Cannot r/o CHF  ·	Outside ECHO showed severe pulmonary HTN and normal ECHO  ·	ECHO done here showed EF is 61%. Grade 1 diastolic dysfunction. Borderline pulmonary HTN  ·	With normal ECHO doubt pt has CHF. LE swelling is likely due chronic venous insufficiency.   ·	S/P IV Lasix. O/E pt is euvolemic. Will switch her to PO Lasix 40 mg daily  ·	Check i's and o's and daily wt  ·	Low salt diet and water restriction to 1.5 L/D  ·	Cardiology eval  ·	Monitor FS. On Insulin sliding scale    * Med rec reviewed. Plan of care d/w the pt. Time spent 40 minutes.

## 2023-07-12 NOTE — DISCHARGE NOTE PROVIDER - HOSPITAL COURSE
77 year old female with past medical history of HTN, DM, Asthma, renal cancer S/P left nephrectomy, HFpEF?. She was doing relatively well until 2 weeks ago when she started noticing progressive lower limb edema started from ankles and going up to thighs. It is associated with dyspnea on exertion with left flank dull pain. There is no chest pain, no palpitations, no orthopnea, or paroxysmal nocturnal dyspnea. She is compliant with her medications, no change in diet, no extra salt intake. No fever or chills, no arthralgia. No change in urine amount, color or consistency.     She was admitted in January due to lower limb edema to [facility], treated with diuretics and discharged home without final diagnosis. Followed with her cardiologist in March and he increased her Lasix dose.     In ED ICU Vital Signs T(F): 97.8 HR: 71 BP: 155/76 RR: 16 SpO2: 97%. Labs notable for , trops negx1. CXR with bilateral/vascular prominence could reflect edema in the appropriate clinical setting. EKG with NSR, possible left atrial enlargement, LVH. Previous echo reviewed, showed normal EF with severe pulmonary HTN. Patient admitted for treatment of presumed CHF exacerbation and fluid overload. Patient received IV Lasix, later switched to oral Lasix. LE edema improved. Repeat echo***   77 year old female with past medical history of HTN, DM, Asthma, renal cancer S/P left nephrectomy, HFpEF?. She was doing relatively well until 2 weeks ago when she started noticing progressive lower limb edema started from ankles and going up to thighs. It is associated with dyspnea on exertion with left flank dull pain. There is no chest pain, no palpitations, no orthopnea, or paroxysmal nocturnal dyspnea. She is compliant with her medications, no change in diet, no extra salt intake. No fever or chills, no arthralgia. No change in urine amount, color or consistency.     She was admitted in January due to lower limb edema to [facility], treated with diuretics and discharged home without final diagnosis. Followed with her cardiologist in March and he increased her Lasix dose.     In ED ICU Vital Signs T(F): 97.8 HR: 71 BP: 155/76 RR: 16 SpO2: 97%. Labs notable for , trops negx1. CXR with bilateral/vascular prominence could reflect edema in the appropriate clinical setting. EKG with NSR, possible left atrial enlargement, LVH. Previous echo reviewed, showed normal EF with severe pulmonary HTN. Patient admitted for treatment of presumed CHF exacerbation and fluid overload. Patient received IV Lasix, later switched to oral Lasix. LE edema improved. Repeat echo normal. Symptoms attributed to chronic venous stasis.   77 year old female with past medical history of HTN, DM, Asthma, renal cancer S/P left nephrectomy, HFpEF?. She was doing relatively well until 2 weeks ago when she started noticing progressive lower limb edema started from ankles and going up to thighs. It is associated with dyspnea on exertion with left flank dull pain. There is no chest pain, no palpitations, no orthopnea, or paroxysmal nocturnal dyspnea. She is compliant with her medications, no change in diet, no extra salt intake. No fever or chills, no arthralgia. No change in urine amount, color or consistency.   Patient was admitted in January due to lower limb edema to [facility], treated with diuretics and discharged home without final diagnosis. Followed with her cardiologist in March and he increased her Lasix dose.     In ED ICU Vital Signs T(F): 97.8 HR: 71 BP: 155/76 RR: 16 SpO2: 97%. Labs notable for , trops negx1. CXR with bilateral/vascular prominence could reflect edema in the appropriate clinical setting. EKG with NSR, possible left atrial enlargement, LVH. Previous echo reviewed, showed normal EF with severe pulmonary HTN. Patient admitted for treatment of presumed CHF exacerbation and fluid overload. Patient received IV Lasix, later switched to oral Lasix. LE edema improved, no other symptoms. Patient able to ambulate with no symptoms. Repeat echo normal. Symptoms on presentation attributed to chronic venous stasis.   77 year old female with past medical history of HTN, DM, Asthma, renal cancer S/P left nephrectomy, HFpEF?. She was doing relatively well until 2 weeks ago when she started noticing progressive lower limb edema started from ankles and going up to thighs. It is associated with dyspnea on exertion with left flank dull pain. There is no chest pain, no palpitations, no orthopnea, or paroxysmal nocturnal dyspnea. She is compliant with her medications, no change in diet, no extra salt intake. No fever or chills, no arthralgia. No change in urine amount, color or consistency.   Patient was admitted in January due to lower limb edema to [facility], treated with diuretics and discharged home without final diagnosis. Followed with her cardiologist in March and he increased her Lasix dose.     In ED ICU Vital Signs T(F): 97.8 HR: 71 BP: 155/76 RR: 16 SpO2: 97%. Labs notable for , trops negx1. CXR with bilateral/vascular prominence could reflect edema in the appropriate clinical setting. EKG with NSR, possible left atrial enlargement, LVH. Previous echo reviewed, showed normal EF with severe pulmonary HTN. Patient admitted for treatment of presumed CHF exacerbation and fluid overload. Patient received IV Lasix, later switched to oral Lasix. Cardiology was consulted and recommended continuing diuresis. LE edema improved, no other symptoms. Patient able to ambulate with no symptoms. Repeat echo normal. Symptoms on presentation attributed to chronic venous stasis. Patient will be discharged on her home dose of Lasix.    77 year old female with past medical history of HTN, DM, Asthma, renal cancer S/P left nephrectomy, HFpEF?. She was doing relatively well until 2 weeks ago when she started noticing progressive lower limb edema started from ankles and going up to thighs. It is associated with dyspnea on exertion with left flank dull pain. There is no chest pain, no palpitations, no orthopnea, or paroxysmal nocturnal dyspnea. She is compliant with her medications, no change in diet, no extra salt intake. No fever or chills, no arthralgia. No change in urine amount, color or consistency.   Patient was admitted in January due to lower limb edema to [facility], treated with diuretics and discharged home without final diagnosis. Followed with her cardiologist in March and he increased her Lasix dose.     In ED ICU Vital Signs T(F): 97.8 HR: 71 BP: 155/76 RR: 16 SpO2: 97%. Labs notable for , trops negx1. CXR with bilateral/vascular prominence could reflect edema in the appropriate clinical setting. EKG with NSR, possible left atrial enlargement, LVH. Previous echo reviewed, showed normal EF with severe pulmonary HTN. Patient admitted for treatment of presumed CHF exacerbation and fluid overload. Patient received IV Lasix, later switched to oral Lasix. Cardiology was consulted and recommended continuing diuresis. LE edema improved, no other symptoms. Patient able to ambulate with no symptoms. Echo showed EF 61%, grade 1 diastolic dysfunction, and borderline pulmonary HTN. Symptoms on presentation attributed to chronic venous stasis. Patient will be discharged on her home dose of Lasix.    77 year old female with past medical history of HTN, DM, Asthma, renal cancer S/P left nephrectomy, HFpEF?. She was doing relatively well until 2 weeks ago when she started noticing progressive lower limb edema started from ankles and going up to thighs. It is associated with dyspnea on exertion with left flank dull pain. There is no chest pain, no palpitations, no orthopnea, or paroxysmal nocturnal dyspnea. She is compliant with her medications, no change in diet, no extra salt intake. No fever or chills, no arthralgia. No change in urine amount, color or consistency.   Patient was admitted in January due to lower limb edema to [facility], treated with diuretics and discharged home without final diagnosis. Followed with her cardiologist in March and he increased her Lasix dose.     In ED ICU Vital Signs T(F): 97.8 HR: 71 BP: 155/76 RR: 16 SpO2: 97%. Labs notable for , trops negx1. CXR with bilateral/vascular prominence could reflect edema in the appropriate clinical setting. EKG with NSR, possible left atrial enlargement, LVH. Previous echo reviewed, showed normal EF with severe pulmonary HTN. Patient admitted for treatment of presumed CHF exacerbation and fluid overload. Patient received IV Lasix, later switched to oral Lasix. Cardiology was consulted and recommended continuing diuresis. Nifedipine switched to Losartan, as CCB can cause LE edema. LE Duplex negative for DVT but with b/l popliteal baker's cysts. LE edema improved, no other symptoms. Patient able to ambulate with no symptoms. Echo showed EF 61%, grade 1 diastolic dysfunction, and borderline pulmonary HTN. Symptoms on presentation attributed to chronic venous stasis. Patient will be discharged on her home dose of Lasix.    77 year old female with past medical history of HTN, DM, Asthma, renal cancer S/P left nephrectomy, HFpEF?. She was doing relatively well until 2 weeks ago when she started noticing progressive lower limb edema started from ankles and going up to thighs. It is associated with dyspnea on exertion with left flank dull pain. There is no chest pain, no palpitations, no orthopnea, or paroxysmal nocturnal dyspnea. She is compliant with her medications, no change in diet, no extra salt intake. No fever or chills, no arthralgia. No change in urine amount, color or consistency.   Patient was admitted in January due to lower limb edema to [facility], treated with diuretics and discharged home without final diagnosis. Followed with her cardiologist in March and he increased her Lasix dose.     In ED ICU Vital Signs T(F): 97.8 HR: 71 BP: 155/76 RR: 16 SpO2: 97%. Labs notable for , trops negx1. CXR with bilateral/vascular prominence could reflect edema in the appropriate clinical setting. EKG with NSR, possible left atrial enlargement, LVH. Previous echo reviewed, showed normal EF with severe pulmonary HTN. Patient admitted for treatment of presumed CHF exacerbation and fluid overload. Patient received IV Lasix, later switched to oral Lasix. Cardiology was consulted and recommended continuing diuresis. Echo showed EF 61%, grade 1 diastolic dysfunction, and borderline pulmonary HTN. . LE Duplex negative for DVT but with b/l popliteal baker's cysts. Renal US with normal appearance of the right kidney, no hydronephrosis or renal stone, moderate to large volume ascites. CT A/P showed no CT evidence for acute intra-abdominal or pelvic pathology and No ascites. Nifedipine switched to Coreg, as CCB can cause LE edema. LE edema improved, no other symptoms. Patient able to ambulate with no symptoms.  Symptoms on presentation attributed to chronic venous stasis vs. nifedipine use. Patient will be discharged on her home dose of Lasix.

## 2023-07-12 NOTE — DISCHARGE NOTE PROVIDER - PROVIDER TOKENS
PROVIDER:[TOKEN:[[id number]],FOLLOWUP:[1 week]] PROVIDER:[TOKEN:[[id number]],FOLLOWUP:[1 week]],PROVIDER:[TOKEN:[[id number]],FOLLOWUP:[1 week]]

## 2023-07-12 NOTE — DISCHARGE NOTE PROVIDER - NSDCFUSCHEDAPPT_GEN_ALL_CORE_FT
Edin Peralta  [facility] PreAdmits  Scheduled Appointment: 07/25/2023    [facility] Physician [facility]  NE[address]RO  [address]  Scheduled Appointment: 07/25/2023    Wendy Gibbons  [facility] Physician [facility]  ENDOCRIN [address]  Scheduled Appointment: 08/01/2023     Edin Peralta  [facility] PreAdmits  Scheduled Appointment: 10/24/2023    [facility]  NEPHRO  [address]  Scheduled Appointment: 10/24/2023    Wendy Gibbons  [facility] Physician [facility]  ENDOCRIN [address]  Scheduled Appointment: 11/16/2023

## 2023-07-12 NOTE — PHYSICAL THERAPY INITIAL EVALUATION ADULT - PERTINENT HX OF CURRENT PROBLEM, REHAB EVAL
77 year old female with past medical history of HTN, DM, Asthma, renal cancer S/P left nephrectomy, HFpEF?. She was doing relatively well until 2 weeks ago when she started noticing progressive lower limb edema started from ankles and going up to thighs. It is associated with dyspnea on exertion with left flank dull pain. There is no chest pain, no palpitations, no orthopnea, or paroxysmal nocturnal dyspnea. She is compliant with her medications, no change in diet, no extra salt intake. No fever or chills, no arthralgia. No change in urine amount, color or consistency.

## 2023-07-12 NOTE — DISCHARGE NOTE PROVIDER - NSDCCPCAREPLAN_GEN_ALL_CORE_FT
PRINCIPAL DISCHARGE DIAGNOSIS  Diagnosis: Fluid overload  Assessment and Plan of Treatment: You were treated for fluid overload. You were given IV Lasix and later switched to oral Lasix.   Please take your medications as directed and follow up with your PMD in one week.     PRINCIPAL DISCHARGE DIAGNOSIS  Diagnosis: Fluid overload  Assessment and Plan of Treatment: You were treated for fluid overload. You were given IV Lasix and later switched to oral Lasix. An echo (imaging of the heart) was performed and was normal. Your episodes of leg swelling were attributed to chronic venous stasis, which is managed conservatively. You can wear compression stockings and keep your legs elevated as much as possible to encourage blood flow in your legs.   Please take your medications as directed and follow up with your PMD in one week.      SECONDARY DISCHARGE DIAGNOSES  Diagnosis: Chronic venous stasis  Assessment and Plan of Treatment:        PRINCIPAL DISCHARGE DIAGNOSIS  Diagnosis: Fluid overload  Assessment and Plan of Treatment: You were treated for fluid overload. You were given IV Lasix and later switched to oral Lasix. An echo (imaging of the heart) was performed and was normal. Your episodes of leg swelling were attributed to chronic venous stasis, which is managed conservatively. You can wear compression stockings and keep your legs elevated as much as possible to encourage blood flow in your legs.   Please take your medications as directed.   Please follow up with Dr. Miller in one week.   Please follow up wiht your PMD in one week.      SECONDARY DISCHARGE DIAGNOSES  Diagnosis: Chronic venous stasis  Assessment and Plan of Treatment:        PRINCIPAL DISCHARGE DIAGNOSIS  Diagnosis: Fluid overload  Assessment and Plan of Treatment: You were treated for fluid overload. You were given IV Lasix and later switched to oral Lasix. A cardiologist saw you, and recommended continuing the oral Lasix. An echo (imaging of the heart) was performed and was normal. Your episodes of leg swelling were attributed to chronic venous stasis, which is managed conservatively. You can wear compression stockings and keep your legs elevated as much as possible to encourage blood flow in your legs.   Please take your medications as directed.   Please follow up with Dr. Miller in one week.   Please follow up wiht your PMD in one week.      SECONDARY DISCHARGE DIAGNOSES  Diagnosis: Chronic venous stasis  Assessment and Plan of Treatment:        PRINCIPAL DISCHARGE DIAGNOSIS  Diagnosis: Fluid overload  Assessment and Plan of Treatment: You were treated for fluid overload. You were given IV Lasix and later switched to oral Lasix. A cardiologist saw you, and recommended continuing the oral Lasix. An echo (imaging of the heart) was performed and showed a normal ejection fraction (amount of blood pumped with each beat). Your episodes of leg swelling were attributed to chronic venous stasis, which is managed conservatively. You can wear compression stockings and keep your legs elevated as much as possible to encourage blood flow in your legs.   Please take your medications as directed.   Please follow up with Dr. Miller in one week.   Please follow up wiht your PMD in one week.      SECONDARY DISCHARGE DIAGNOSES  Diagnosis: Chronic venous stasis  Assessment and Plan of Treatment:

## 2023-07-12 NOTE — DISCHARGE NOTE PROVIDER - CARE PROVIDER_API CALL
Chip Gil  Internal Medicine  [address] [place] [address]  Phone: [phone]  Fax: [phone]  Follow Up Time: 1 week   Chip Gil  Internal Medicine  [address] [place] [address]  Phone: [phone]  Fax: [phone]  Follow Up Time: 1 week    Filippo Miller  Cardiovascular Disease  [address] [place] [address], [address] [address]  Phone: [phone]  Fax: [phone]  Follow Up Time: 1 week

## 2023-07-12 NOTE — DISCHARGE NOTE PROVIDER - NSDCMRMEDTOKEN_GEN_ALL_CORE_FT
Albuterol (Eqv-ProAir HFA) 90 mcg/inh inhalation aerosol: 2 puff(s) inhaled every 6 hours as needed for  bronchospasm  fluticasone-vilanterol 100 mcg-25 mcg/inh inhalation powder: 1 puff(s) inhaled once a day  furosemide 40 mg oral tablet: 1 orally once a day  metFORMIN 500 mg oral tablet: 1 tab(s) orally 2 times a day  NIFEdipine 60 mg oral tablet, extended release: 1 tab(s) orally once a day  pioglitazone 45 mg oral tablet: 1 tab(s) orally once a day   Albuterol (Eqv-ProAir HFA) 90 mcg/inh inhalation aerosol: 2 puff(s) inhaled every 6 hours as needed for  bronchospasm  carvedilol 6.25 mg oral tablet: 1 tab(s) orally 2 times a day  fluticasone-vilanterol 100 mcg-25 mcg/inh inhalation powder: 1 puff(s) inhaled once a day  furosemide 40 mg oral tablet: 1 tab(s) orally once a day  metFORMIN 500 mg oral tablet: 1 tab(s) orally 2 times a day  pioglitazone 45 mg oral tablet: 1 tab(s) orally once a day

## 2023-07-12 NOTE — PHYSICAL THERAPY INITIAL EVALUATION ADULT - NSACTIVITYREC_GEN_A_PT
Patient requires supervision with functional mobility. PT recommends D/C to home with Home PT when medically appropriate. PT recommends no AD for D/C to home, pt owns RW. Refer to evaluation for details.

## 2023-07-12 NOTE — PHYSICAL THERAPY INITIAL EVALUATION ADULT - ADDITIONAL COMMENTS
Pt lives in apartment with 0 steps to enter, 0 inside, granddaughter lives nearby and able to help her if needed. Pt is previously independent with [employer].

## 2023-07-13 LAB
ANION GAP SERPL CALC-SCNC: 12 MMOL/L — SIGNIFICANT CHANGE UP (ref 7–14)
BUN SERPL-MCNC: 36 MG/DL — HIGH (ref 10–20)
CALCIUM SERPL-MCNC: 9.3 MG/DL — SIGNIFICANT CHANGE UP (ref 8.4–10.5)
CHLORIDE SERPL-SCNC: 101 MMOL/L — SIGNIFICANT CHANGE UP (ref 98–110)
CO2 SERPL-SCNC: 26 MMOL/L — SIGNIFICANT CHANGE UP (ref 17–32)
CREAT SERPL-MCNC: 1.4 MG/DL — SIGNIFICANT CHANGE UP (ref 0.7–1.5)
EGFR: 39 ML/MIN/1.73M2 — LOW
GLUCOSE BLDC GLUCOMTR-MCNC: 115 MG/DL — HIGH (ref 70–99)
GLUCOSE BLDC GLUCOMTR-MCNC: 144 MG/DL — HIGH (ref 70–99)
GLUCOSE BLDC GLUCOMTR-MCNC: 205 MG/DL — HIGH (ref 70–99)
GLUCOSE SERPL-MCNC: 130 MG/DL — HIGH (ref 70–99)
HCT VFR BLD CALC: 41.7 % — SIGNIFICANT CHANGE UP (ref 37–47)
HGB BLD-MCNC: 13.3 G/DL — SIGNIFICANT CHANGE UP (ref 12–16)
MAGNESIUM SERPL-MCNC: 2.2 MG/DL — SIGNIFICANT CHANGE UP (ref 1.8–2.4)
MCHC RBC-ENTMCNC: 30 PG — SIGNIFICANT CHANGE UP (ref 27–31)
MCHC RBC-ENTMCNC: 31.9 G/DL — LOW (ref 32–37)
MCV RBC AUTO: 93.9 FL — SIGNIFICANT CHANGE UP (ref 81–99)
NRBC # BLD: 0 /100 WBCS — SIGNIFICANT CHANGE UP (ref 0–0)
PLATELET # BLD AUTO: 274 K/UL — SIGNIFICANT CHANGE UP (ref 130–400)
PMV BLD: 9.5 FL — SIGNIFICANT CHANGE UP (ref 7.4–10.4)
POTASSIUM SERPL-MCNC: 4.6 MMOL/L — SIGNIFICANT CHANGE UP (ref 3.5–5)
POTASSIUM SERPL-SCNC: 4.6 MMOL/L — SIGNIFICANT CHANGE UP (ref 3.5–5)
RBC # BLD: 4.44 M/UL — SIGNIFICANT CHANGE UP (ref 4.2–5.4)
RBC # FLD: 13 % — SIGNIFICANT CHANGE UP (ref 11.5–14.5)
SODIUM SERPL-SCNC: 139 MMOL/L — SIGNIFICANT CHANGE UP (ref 135–146)
WBC # BLD: 6.12 K/UL — SIGNIFICANT CHANGE UP (ref 4.8–10.8)
WBC # FLD AUTO: 6.12 K/UL — SIGNIFICANT CHANGE UP (ref 4.8–10.8)

## 2023-07-13 PROCEDURE — 99233 SBSQ HOSP IP/OBS HIGH 50: CPT

## 2023-07-13 PROCEDURE — 76770 US EXAM ABDO BACK WALL COMP: CPT | Mod: 26

## 2023-07-13 RX ORDER — LOSARTAN POTASSIUM 100 MG/1
50 TABLET, FILM COATED ORAL DAILY
Refills: 0 | Status: DISCONTINUED | OUTPATIENT
Start: 2023-07-13 | End: 2023-07-14

## 2023-07-13 RX ADMIN — Medication 4: at 11:30

## 2023-07-13 RX ADMIN — Medication 60 MILLIGRAM(S): at 05:24

## 2023-07-13 NOTE — PROGRESS NOTE ADULT - SUBJECTIVE AND OBJECTIVE BOX
CHIKA DEWEYINE  77y Female    CHIEF COMPLAINT:    Patient is a 77y old  Female who presents with a chief complaint of lower limb edema (12 Jul 2023 14:52)      INTERVAL HPI/OVERNIGHT EVENTS:    Patient seen and examined. No sob. No more LE    ROS: All other systems are negative.    Vital Signs:    T(F): 97.9 (07-13-23 @ 12:47), Max: 97.9 (07-13-23 @ 12:47)  HR: 67 (07-13-23 @ 12:47) (63 - 68)  BP: 136/63 (07-13-23 @ 12:47) (136/63 - 160/80)  RR: 18 (07-13-23 @ 12:47) (18 - 18)  SpO2: 113% (07-12-23 @ 19:58) (113% - 113%)  I&O's Summary    12 Jul 2023 07:01  -  13 Jul 2023 07:00  --------------------------------------------------------  IN: 1454 mL / OUT: 850 mL / NET: 604 mL    13 Jul 2023 07:01  -  13 Jul 2023 15:56  --------------------------------------------------------  IN: 480 mL / OUT: 600 mL / NET: -120 mL      Daily     Daily   CAPILLARY BLOOD GLUCOSE      POCT Blood Glucose.: 205 mg/dL (13 Jul 2023 11:11)  POCT Blood Glucose.: 144 mg/dL (13 Jul 2023 07:47)  POCT Blood Glucose.: 142 mg/dL (12 Jul 2023 21:26)  POCT Blood Glucose.: 136 mg/dL (12 Jul 2023 17:16)      PHYSICAL EXAM:    GENERAL:  NAD  SKIN: No rashes or lesions  HENT: Atraumatic. Normocephalic. PERRL. Moist membranes.  NECK: Supple, No JVD. No lymphadenopathy.  PULMONARY: CTA B/L. No wheezing. No rales  CVS: Normal S1, S2. Rate and Rhythm are regular. No murmurs.  ABDOMEN/GI: Soft, Nontender, Nondistended; BS present  EXTREMITIES: Peripheral pulses intact. No edema B/L LE.  NEUROLOGIC:  No motor or sensory deficit.  PSYCH: Alert & oriented x 3    Consultant(s) Notes Reviewed:  [x ] YES  [ ] NO  Care Discussed with Consultants/Other Providers [ x] YES  [ ] NO    EKG reviewed  Telemetry reviewed    LABS:                        13.3   6.12  )-----------( 274      ( 13 Jul 2023 06:37 )             41.7     07-13    139  |  101  |  36<H>  ----------------------------<  130<H>  Creatinine Trend: 1.4<--, 1.4<--, 1.2<--, 1.2<--  4.6   |  26  |  1.4    Ca    9.3      13 Jul 2023 06:37  Mg     2.2     07-13                RADIOLOGY & ADDITIONAL TESTS:    < from: [facility] Duplex Lower Ext Vein Scan, Bilat (07.12.23 @ 19:10) >    IMPRESSION:  No evidence of deep venous thrombosis in either lower extremity.    Right peroneal vein, left anterior tibial vein and peroneal vein not   visualized  Popliteal Baker's cyst bilaterally measuring right side 4 x 3 x 1 cm left   side 2 x 3 x 2 cm    < end of copied text >    Imaging or report Personally Reviewed:  [x ] YES  [ ] NO    Medications:  Standing  budesonide  80 MICROgram(s)/formoterol 4.5 MICROgram(s) Inhaler 2 Puff(s) Inhalation two times a day  dextrose 5%. 1000 milliLiter(s) IV Continuous <Continuous>  dextrose 5%. 1000 milliLiter(s) IV Continuous <Continuous>  dextrose 50% Injectable 12.5 Gram(s) IV Push once  dextrose 50% Injectable 25 Gram(s) IV Push once  dextrose 50% Injectable 25 Gram(s) IV Push once  enoxaparin Injectable 40 milliGRAM(s) SubCutaneous every 24 hours  glucagon  Injectable 1 milliGRAM(s) IntraMuscular once  insulin lispro (ADMELOG) corrective regimen sliding scale   SubCutaneous three times a day before meals  NIFEdipine XL 60 milliGRAM(s) Oral daily    PRN Meds  albuterol    90 MICROgram(s) HFA Inhaler 2 Puff(s) Inhalation every 6 hours PRN  dextrose Oral Gel 15 Gram(s) Oral once PRN      Case discussed with resident    Care discussed with pt/family

## 2023-07-13 NOTE — PROGRESS NOTE ADULT - ASSESSMENT
77 year old female with past medical history of HTN, DM, Asthma, renal cancer S/P left nephrectomy, HFpEF. She was doing relatively well until 2 weeks ago when she started noticing progressive lower limb edema started from ankles and going up to thighs associated with MENDEZ.    Acute HFpEF  DM-2 / HTN  H/O Asthma  H/O Renal cell carcinoma S/P L Nephrectomy  Morbid obesity  Borderline pulmonary HTN  B/L LE swelling                PLAN:    ·	Tele reviewed. No events  ·	CXR showed b/l interstitial/vascular prominence  ·	Pro BNP is 171. Low, likely due to obesity. Cannot r/o CHF  ·	Outside ECHO showed severe pulmonary HTN and normal ECHO  ·	ECHO done here showed EF is 61%. Grade 1 diastolic dysfunction. Borderline pulmonary HTN  ·	Venous doppler of the LE is negative for DVT. It showed b/l baker cysts.   ·	With normal ECHO doubt pt has CHF. LE swelling could be likely due chronic venous insufficiency, vs Nifedipine vs due to Baker cyst.   ·	Nifedipine has side effect of LE swelling. Will d/c Nifedipine and start her on Losartan 50 mg po daily  ·	S/P IV Lasix. O/E pt is euvolemic. Will switch her to PO Lasix 40 mg daily  ·	Check i's and o's and daily wt  ·	Low salt diet and water restriction to 1.5 L/D  ·	Cardiology eval  ·	Monitor FS. On Insulin sliding scale  ·	Check Renal US    * Med rec reviewed. Plan of care d/w the pt. Time spent 40 minutes.     Progress Note Handoff    Pending (specify):  Consults_________, Tests________, Test Results_______, Other_Anticipate d/c in AM________  Family discussion:  Disposition: Home___/SNF___/Other________/Unknown at this time________    Daniel Elizabeth MD  Spectra: [phone]

## 2023-07-14 LAB
ANION GAP SERPL CALC-SCNC: 10 MMOL/L — SIGNIFICANT CHANGE UP (ref 7–14)
BUN SERPL-MCNC: 39 MG/DL — HIGH (ref 10–20)
CALCIUM SERPL-MCNC: 9.6 MG/DL — SIGNIFICANT CHANGE UP (ref 8.4–10.5)
CHLORIDE SERPL-SCNC: 102 MMOL/L — SIGNIFICANT CHANGE UP (ref 98–110)
CO2 SERPL-SCNC: 27 MMOL/L — SIGNIFICANT CHANGE UP (ref 17–32)
CREAT SERPL-MCNC: 1.2 MG/DL — SIGNIFICANT CHANGE UP (ref 0.7–1.5)
EGFR: 46 ML/MIN/1.73M2 — LOW
GLUCOSE BLDC GLUCOMTR-MCNC: 105 MG/DL — HIGH (ref 70–99)
GLUCOSE BLDC GLUCOMTR-MCNC: 117 MG/DL — HIGH (ref 70–99)
GLUCOSE BLDC GLUCOMTR-MCNC: 119 MG/DL — HIGH (ref 70–99)
GLUCOSE BLDC GLUCOMTR-MCNC: 209 MG/DL — HIGH (ref 70–99)
GLUCOSE SERPL-MCNC: 132 MG/DL — HIGH (ref 70–99)
HCT VFR BLD CALC: 47.6 % — HIGH (ref 37–47)
HGB BLD-MCNC: 15.1 G/DL — SIGNIFICANT CHANGE UP (ref 12–16)
MAGNESIUM SERPL-MCNC: 2.4 MG/DL — SIGNIFICANT CHANGE UP (ref 1.8–2.4)
MCHC RBC-ENTMCNC: 30 PG — SIGNIFICANT CHANGE UP (ref 27–31)
MCHC RBC-ENTMCNC: 31.7 G/DL — LOW (ref 32–37)
MCV RBC AUTO: 94.6 FL — SIGNIFICANT CHANGE UP (ref 81–99)
NRBC # BLD: 0 /100 WBCS — SIGNIFICANT CHANGE UP (ref 0–0)
PLATELET # BLD AUTO: 275 K/UL — SIGNIFICANT CHANGE UP (ref 130–400)
PMV BLD: 9.9 FL — SIGNIFICANT CHANGE UP (ref 7.4–10.4)
POTASSIUM SERPL-MCNC: 4.2 MMOL/L — SIGNIFICANT CHANGE UP (ref 3.5–5)
POTASSIUM SERPL-SCNC: 4.2 MMOL/L — SIGNIFICANT CHANGE UP (ref 3.5–5)
RBC # BLD: 5.03 M/UL — SIGNIFICANT CHANGE UP (ref 4.2–5.4)
RBC # FLD: 12.9 % — SIGNIFICANT CHANGE UP (ref 11.5–14.5)
SODIUM SERPL-SCNC: 139 MMOL/L — SIGNIFICANT CHANGE UP (ref 135–146)
WBC # BLD: 6.05 K/UL — SIGNIFICANT CHANGE UP (ref 4.8–10.8)
WBC # FLD AUTO: 6.05 K/UL — SIGNIFICANT CHANGE UP (ref 4.8–10.8)

## 2023-07-14 PROCEDURE — 99233 SBSQ HOSP IP/OBS HIGH 50: CPT

## 2023-07-14 PROCEDURE — 74177 CT ABD & PELVIS W/CONTRAST: CPT | Mod: 26

## 2023-07-14 RX ORDER — CARVEDILOL PHOSPHATE 80 MG/1
3.12 CAPSULE, EXTENDED RELEASE ORAL EVERY 12 HOURS
Refills: 0 | Status: DISCONTINUED | OUTPATIENT
Start: 2023-07-14 | End: 2023-07-16

## 2023-07-14 RX ORDER — DIATRIZOATE MEGLUMINE 180 MG/ML
30 INJECTION, SOLUTION INTRAVESICAL ONCE
Refills: 0 | Status: COMPLETED | OUTPATIENT
Start: 2023-07-14 | End: 2023-07-14

## 2023-07-14 RX ADMIN — CARVEDILOL PHOSPHATE 3.12 MILLIGRAM(S): 80 CAPSULE, EXTENDED RELEASE ORAL at 17:07

## 2023-07-14 RX ADMIN — DIATRIZOATE MEGLUMINE 30 MILLILITER(S): 180 INJECTION, SOLUTION INTRAVESICAL at 15:03

## 2023-07-14 RX ADMIN — BUDESONIDE AND FORMOTEROL FUMARATE DIHYDRATE 2 PUFF(S): 160; 4.5 AEROSOL RESPIRATORY (INHALATION) at 09:09

## 2023-07-14 RX ADMIN — LOSARTAN POTASSIUM 50 MILLIGRAM(S): 100 TABLET, FILM COATED ORAL at 05:20

## 2023-07-14 NOTE — PROGRESS NOTE ADULT - ASSESSMENT
77 year old female with past medical history of HTN, DM, Asthma, renal cancer S/P left nephrectomy, HFpEF. She was doing relatively well until 2 weeks ago when she started noticing progressive lower limb edema started from ankles and going up to thighs associated with MENDEZ. Now with new ascites on ultrasound.     #b/l LE edema  -, pt is obese   -CXR showed b/l interstitial/vascular prominence  -Outside TTE showed severe pHTN and normal EF  -TTE here showed EF 61%, borderline pHTN, GIDD  -LE duplex negative for DVT, showed b/l popliteal baker cysts   -s/p IV lasix, now on po Lasix 40mg daily  -d/c nifedipine as it can cause peripheral edema   -Check i's and o's and daily wt  -Low salt diet and water restriction to 1.5 L/D  -CHF unlikely given normal TTE. edema possibly due to CVI vs. b/l baker cysts vs. side effect of nifedipine     #New ascites   -renal ultrasound: status post left nephrectomy. Normal appearance of the right kidney. No hydronephrosis or renal stone. Moderate to large volume ascites.  -diagnostic paracentesis   -CT A/P with PO and IV contrast

## 2023-07-14 NOTE — PROGRESS NOTE ADULT - SUBJECTIVE AND OBJECTIVE BOX
24H events:    Patient is a 77y old Female who presents with a chief complaint of lower limb edema (13 Jul 2023 15:55)    Primary diagnosis of Fluid overload       Today is hospital day 4d. This morning patient was seen and examined at bedside, resting comfortably in bed.      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 1000 milliLiter(s) IV Continuous <Continuous>  dextrose 5%. 1000 milliLiter(s) IV Continuous <Continuous>  dextrose 50% Injectable 12.5 Gram(s) IV Push once  dextrose 50% Injectable 25 Gram(s) IV Push once  dextrose 50% Injectable 25 Gram(s) IV Push once  diatrizoate meglumine/diatrizoate sodium. 30 milliLiter(s) Oral once  enoxaparin Injectable 40 milliGRAM(s) SubCutaneous every 24 hours  glucagon  Injectable 1 milliGRAM(s) IntraMuscular once  insulin lispro (ADMELOG) corrective regimen sliding scale   SubCutaneous three times a day before meals  losartan 50 milliGRAM(s) Oral daily    PRN MEDICATIONS  albuterol    90 MICROgram(s) HFA Inhaler 2 Puff(s) Inhalation every 6 hours PRN  dextrose Oral Gel 15 Gram(s) Oral once PRN    VITALS:   T(F): 97  HR: 74  BP: 130/69  RR: 18  SpO2: 95%    PHYSICAL EXAM:  GENERAL: NAD, well-groomed, well-developed  HEAD:  Atraumatic, Normocephalic  EYES: EOMI  NECK: Supple  NERVOUS SYSTEM:  Alert & Oriented X3, non focal   CHEST/LUNG: Clear to auscultation bilaterally; No rales, rhonchi, wheezing, or rubs  HEART: Regular rate and rhythm; No murmurs, rubs, or gallops  ABDOMEN: Soft, Nontender, Nondistended  EXTREMITIES:1+ pitting edema b/l   LYMPH: No lymphadenopathy noted  SKIN: No rashes or lesions    LABS:                        15.1   6.05  )-----------( 275      ( 14 Jul 2023 06:24 )             47.6     07-14    139  |  102  |  39<H>  ----------------------------<  132<H>  4.2   |  27  |  1.2    Ca    9.6      14 Jul 2023 06:24  Mg     2.4     07-14        Urinalysis Basic - ( 14 Jul 2023 06:24 )    Color: x / Appearance: x / SG: x / pH: x  Gluc: 132 mg/dL / Ketone: x  / Bili: x / Urobili: x   Blood: x / Protein: x / Nitrite: x   Leuk Esterase: x / RBC: x / WBC x   Sq Epi: x / Non Sq Epi: x / Bacteria: x                RADIOLOGY:

## 2023-07-14 NOTE — PROGRESS NOTE ADULT - ASSESSMENT
77 year old female with past medical history of HTN, DM, Asthma, renal cancer S/P left nephrectomy, HFpEF. She was doing relatively well until 2 weeks ago when she started noticing progressive lower limb edema started from ankles and going up to thighs associated with MENDEZ.    Acute HFpEF  DM-2 / HTN  H/O Asthma  H/O Renal cell carcinoma S/P L Nephrectomy  Morbid obesity  Borderline pulmonary HTN  B/L LE swelling                PLAN:    ·	Tele reviewed. No events  ·	Renal US noted. L nephrectomy. Normal R kidney. Mod to large size ascites.   ·	Will do diagnostic tap and CT abd/pelvis with po and iv contrast.   ·	CXR showed b/l interstitial/vascular prominence  ·	Pro BNP is 171. Low, likely due to obesity. Cannot r/o CHF  ·	ECHO showed EF is 61%. Grade 1 diastolic dysfunction. Borderline pulmonary HTN  ·	Venous doppler of the LE is negative for DVT. It showed b/l baker cysts.   ·	With normal ECHO doubt pt has CHF. LE swelling could be likely due chronic venous insufficiency, vs Nifedipine vs due to Baker cyst.   ·	Nifedipine has side effect of LE swelling. Will d/c Nifedipine and start her on Coreg 3.25 mg po q 12h  ·	S/P IV Lasix. O/E pt is euvolemic. Will switch her to PO Lasix 40 mg daily  ·	Check i's and o's and daily wt  ·	Low salt diet and water restriction to 1.5 L/D  ·	Cardiology eval  ·	Monitor FS. On Insulin sliding scale    * Med rec reviewed. Plan of care d/w the pt. Time spent 40 minutes.     Progress Note Handoff    Pending (specify):  Consults_________, Tests_CT abd/pelvis_______, Test Results_______, Other_Ascites tap_______  Family discussion:  Disposition: Home___/SNF___/Other________/Unknown at this time________    Daniel Elizabeth MD  Spectra: [phone]

## 2023-07-14 NOTE — CHART NOTE - NSCHARTNOTEFT_GEN_A_CORE
ED procedure team consulted for diagnostic paracentesis. Procedure team evaluated patient and no accessible pocket identified for para. Risk deemed to outweigh potential benefits. Primary team notified. Please reconsult if needed.

## 2023-07-14 NOTE — PROGRESS NOTE ADULT - SUBJECTIVE AND OBJECTIVE BOX
RENITA DEWEY  77y Female    CHIEF COMPLAINT:    Patient is a 77y old  Female who presents with a chief complaint of lower limb edema (2023 11:25)      INTERVAL HPI/OVERNIGHT EVENTS:    Patient seen and examined. No sob. No abdominal pain. Abd US showed ascites     ROS: All other systems are negative.    Vital Signs:    T(F): 97 (23 @ 04:38), Max: 97.9 (23 @ 12:47)  HR: 74 (23 @ 19:31) (67 - 74)  BP: 130/69 (23 @ 04:38) (130/69 - 136/63)  RR: 18 (23 @ 04:38) (18 - 20)  SpO2: 95% (23 @ 20:18) (91% - 95%)  I&O's Summary    2023 07:01  -  2023 07:00  --------------------------------------------------------  IN: 701 mL / OUT: 1000 mL / NET: -299 mL      Daily     Daily Weight in k.8 (2023 04:38)  CAPILLARY BLOOD GLUCOSE      POCT Blood Glucose.: 209 mg/dL (2023 11:36)  POCT Blood Glucose.: 117 mg/dL (2023 07:32)  POCT Blood Glucose.: 115 mg/dL (2023 17:54)      PHYSICAL EXAM:    GENERAL:  NAD  SKIN: No rashes or lesions  HENT: Atraumatic. Normocephalic. PERRL. Moist membranes.  NECK: Supple, No JVD. No lymphadenopathy.  PULMONARY: CTA B/L. No wheezing. No rales  CVS: Normal S1, S2. Rate and Rhythm are regular. No murmurs.  ABDOMEN/GI: Soft, Nontender, Nondistended; BS present. +ve shifting dullness.   EXTREMITIES: Peripheral pulses intact. Trace edema B/L LE.  NEUROLOGIC:  No motor or sensory deficit.  PSYCH: Alert & oriented x 3    Consultant(s) Notes Reviewed:  [x ] YES  [ ] NO  Care Discussed with Consultants/Other Providers [ x] YES  [ ] NO    EKG reviewed  Telemetry reviewed    LABS:                        15.1   6.05  )-----------( 275      ( 2023 06:24 )             47.6     07-14    139  |  102  |  39<H>  ----------------------------<  132<H>  4.2   |  27  |  1.2    Ca    9.6      2023 06:24  Mg     2.4     -                RADIOLOGY & ADDITIONAL TESTS:    < from: US Kidney and Bladder (23 @ 16:21) >  IMPRESSION:    Status post left nephrectomy. Normal appearance of the right kidney. No   hydronephrosis or renal stone.    Moderate to large volume ascites.    < end of copied text >    Imaging or report Personally Reviewed:  [x ] YES  [ ] NO    Medications:  Standing  budesonide  80 MICROgram(s)/formoterol 4.5 MICROgram(s) Inhaler 2 Puff(s) Inhalation two times a day  dextrose 5%. 1000 milliLiter(s) IV Continuous <Continuous>  dextrose 5%. 1000 milliLiter(s) IV Continuous <Continuous>  dextrose 50% Injectable 12.5 Gram(s) IV Push once  dextrose 50% Injectable 25 Gram(s) IV Push once  dextrose 50% Injectable 25 Gram(s) IV Push once  diatrizoate meglumine/diatrizoate sodium. 30 milliLiter(s) Oral once  enoxaparin Injectable 40 milliGRAM(s) SubCutaneous every 24 hours  glucagon  Injectable 1 milliGRAM(s) IntraMuscular once  insulin lispro (ADMELOG) corrective regimen sliding scale   SubCutaneous three times a day before meals  losartan 50 milliGRAM(s) Oral daily    PRN Meds  albuterol    90 MICROgram(s) HFA Inhaler 2 Puff(s) Inhalation every 6 hours PRN  dextrose Oral Gel 15 Gram(s) Oral once PRN      Case discussed with resident    Care discussed with pt/family

## 2023-07-15 LAB
ANION GAP SERPL CALC-SCNC: 8 MMOL/L — SIGNIFICANT CHANGE UP (ref 7–14)
BUN SERPL-MCNC: 32 MG/DL — HIGH (ref 10–20)
CALCIUM SERPL-MCNC: 9.2 MG/DL — SIGNIFICANT CHANGE UP (ref 8.4–10.5)
CHLORIDE SERPL-SCNC: 107 MMOL/L — SIGNIFICANT CHANGE UP (ref 98–110)
CO2 SERPL-SCNC: 26 MMOL/L — SIGNIFICANT CHANGE UP (ref 17–32)
CREAT SERPL-MCNC: 1.2 MG/DL — SIGNIFICANT CHANGE UP (ref 0.7–1.5)
EGFR: 46 ML/MIN/1.73M2 — LOW
GLUCOSE BLDC GLUCOMTR-MCNC: 111 MG/DL — HIGH (ref 70–99)
GLUCOSE BLDC GLUCOMTR-MCNC: 122 MG/DL — HIGH (ref 70–99)
GLUCOSE BLDC GLUCOMTR-MCNC: 158 MG/DL — HIGH (ref 70–99)
GLUCOSE BLDC GLUCOMTR-MCNC: 169 MG/DL — HIGH (ref 70–99)
GLUCOSE SERPL-MCNC: 129 MG/DL — HIGH (ref 70–99)
HCT VFR BLD CALC: 40.3 % — SIGNIFICANT CHANGE UP (ref 37–47)
HGB BLD-MCNC: 12.7 G/DL — SIGNIFICANT CHANGE UP (ref 12–16)
MAGNESIUM SERPL-MCNC: 2.3 MG/DL — SIGNIFICANT CHANGE UP (ref 1.8–2.4)
MCHC RBC-ENTMCNC: 29.8 PG — SIGNIFICANT CHANGE UP (ref 27–31)
MCHC RBC-ENTMCNC: 31.5 G/DL — LOW (ref 32–37)
MCV RBC AUTO: 94.6 FL — SIGNIFICANT CHANGE UP (ref 81–99)
NRBC # BLD: 0 /100 WBCS — SIGNIFICANT CHANGE UP (ref 0–0)
PLATELET # BLD AUTO: 252 K/UL — SIGNIFICANT CHANGE UP (ref 130–400)
PMV BLD: 9.5 FL — SIGNIFICANT CHANGE UP (ref 7.4–10.4)
POTASSIUM SERPL-MCNC: 4.9 MMOL/L — SIGNIFICANT CHANGE UP (ref 3.5–5)
POTASSIUM SERPL-SCNC: 4.9 MMOL/L — SIGNIFICANT CHANGE UP (ref 3.5–5)
RBC # BLD: 4.26 M/UL — SIGNIFICANT CHANGE UP (ref 4.2–5.4)
RBC # FLD: 12.9 % — SIGNIFICANT CHANGE UP (ref 11.5–14.5)
SODIUM SERPL-SCNC: 141 MMOL/L — SIGNIFICANT CHANGE UP (ref 135–146)
WBC # BLD: 6.55 K/UL — SIGNIFICANT CHANGE UP (ref 4.8–10.8)
WBC # FLD AUTO: 6.55 K/UL — SIGNIFICANT CHANGE UP (ref 4.8–10.8)

## 2023-07-15 PROCEDURE — 99232 SBSQ HOSP IP/OBS MODERATE 35: CPT

## 2023-07-15 RX ORDER — FUROSEMIDE 40 MG
40 TABLET ORAL DAILY
Refills: 0 | Status: DISCONTINUED | OUTPATIENT
Start: 2023-07-15 | End: 2023-07-16

## 2023-07-15 RX ADMIN — Medication 40 MILLIGRAM(S): at 12:01

## 2023-07-15 RX ADMIN — Medication 2: at 12:01

## 2023-07-15 RX ADMIN — CARVEDILOL PHOSPHATE 3.12 MILLIGRAM(S): 80 CAPSULE, EXTENDED RELEASE ORAL at 17:18

## 2023-07-15 RX ADMIN — ENOXAPARIN SODIUM 40 MILLIGRAM(S): 100 INJECTION SUBCUTANEOUS at 17:18

## 2023-07-15 RX ADMIN — CARVEDILOL PHOSPHATE 3.12 MILLIGRAM(S): 80 CAPSULE, EXTENDED RELEASE ORAL at 05:20

## 2023-07-15 NOTE — PROGRESS NOTE ADULT - ASSESSMENT
77 year old female with past medical history of HTN, DM, Asthma, renal cancer S/P left nephrectomy, HFpEF. She was doing relatively well until 2 weeks ago when she started noticing progressive lower limb edema started from ankles and going up to thighs associated with MENDEZ.    Acute HFpEF  DM-2 / HTN  H/O Asthma  H/O Renal cell carcinoma S/P L Nephrectomy  Morbid obesity  Borderline pulmonary HTN  B/L LE swelling                PLAN:    ·	Tele reviewed. No events  ·	Waiting for diagnostic tap by IR likely Mond  ·	Check CT abd/pelvis result.   ·	Renal US noted. L nephrectomy. Normal R kidney. Mod to large size ascites.   ·	CXR showed b/l interstitial/vascular prominence  ·	Pro BNP is 171. Low, likely due to obesity. Cannot r/o CHF  ·	ECHO showed EF is 61%. Grade 1 diastolic dysfunction. Borderline pulmonary HTN  ·	Venous doppler of the LE is negative for DVT. It showed b/l baker cysts.   ·	With normal ECHO doubt pt has CHF. LE swelling could be likely due chronic venous insufficiency, vs Nifedipine vs due to Baker cyst.   ·	Nifedipine has side effect of LE swelling. Stopped Nifedipine and started her on Coreg 3.25 mg po q 12h. If the BP remains elevated, can increase the dose to 6.25 mg po q 12h  ·	S/P IV Lasix. Now on PO Lasix 40 mg daily  ·	Check i's and o's and daily wt  ·	Low salt diet and water restriction to 1.5 L/D  ·	Cardiology eval  ·	Monitor FS. On Insulin sliding scale    * Med rec reviewed. Plan of care d/w the pt. Time spent 40 minutes.     Progress Note Handoff    Pending (specify):  Consults_________, Tests________, Test Results_CT abd/pelvis______, Other_Ascites tap_______  Family discussion:  Disposition: Home___/SNF___/Other________/Unknown at this time________    Daniel Elizabeth MD  Spectra: [phone]

## 2023-07-15 NOTE — PROGRESS NOTE ADULT - SUBJECTIVE AND OBJECTIVE BOX
RENITA DEWEY  77y Female    CHIEF COMPLAINT:    Patient is a 77y old  Female who presents with a chief complaint of lower limb edema (14 Jul 2023 12:38)      INTERVAL HPI/OVERNIGHT EVENTS:    Patient seen and examined. No sob. No LE edema.     ROS: All other systems are negative.    Vital Signs:    T(F): 98.4 (07-15-23 @ 13:01), Max: 98.4 (07-15-23 @ 13:01)  HR: 61 (07-15-23 @ 13:01) (60 - 73)  BP: 176/82 (07-15-23 @ 13:01) (126/60 - 176/82)  RR: 18 (07-15-23 @ 13:01) (18 - 18)  SpO2: 96% (07-14-23 @ 16:56) (96% - 96%)  I&O's Summary    14 Jul 2023 07:01  -  15 Jul 2023 07:00  --------------------------------------------------------  IN: 586 mL / OUT: 875 mL / NET: -289 mL      Daily     Daily   CAPILLARY BLOOD GLUCOSE      POCT Blood Glucose.: 169 mg/dL (15 Jul 2023 11:50)  POCT Blood Glucose.: 122 mg/dL (15 Jul 2023 08:11)  POCT Blood Glucose.: 119 mg/dL (14 Jul 2023 21:04)  POCT Blood Glucose.: 105 mg/dL (14 Jul 2023 16:45)      PHYSICAL EXAM:    GENERAL:  NAD  SKIN: No rashes or lesions  HENT: Atraumatic. Normocephalic. PERRL. Moist membranes.  NECK: Supple, No JVD. No lymphadenopathy.  PULMONARY: CTA B/L. No wheezing. No rales  CVS: Normal S1, S2. Rate and Rhythm are regular. No murmurs.  ABDOMEN/GI: Soft, Nontender, distended; BS present  EXTREMITIES: Peripheral pulses intact. No edema B/L LE.  NEUROLOGIC:  No motor or sensory deficit.  PSYCH: Alert & oriented x 3    Consultant(s) Notes Reviewed:  [x ] YES  [ ] NO  Care Discussed with Consultants/Other Providers [ x] YES  [ ] NO    EKG reviewed  Telemetry reviewed    LABS:                        12.7   6.55  )-----------( 252      ( 15 Jul 2023 08:15 )             40.3     07-15    141  |  107  |  32<H>  ----------------------------<  129<H>  4.9   |  26  |  1.2    Ca    9.2      15 Jul 2023 08:15  Mg     2.3     07-15                RADIOLOGY & ADDITIONAL TESTS:      Imaging or report Personally Reviewed:  [x ] YES  [ ] NO    Medications:  Standing  budesonide  80 MICROgram(s)/formoterol 4.5 MICROgram(s) Inhaler 2 Puff(s) Inhalation two times a day  carvedilol 3.125 milliGRAM(s) Oral every 12 hours  dextrose 5%. 1000 milliLiter(s) IV Continuous <Continuous>  dextrose 5%. 1000 milliLiter(s) IV Continuous <Continuous>  dextrose 50% Injectable 25 Gram(s) IV Push once  dextrose 50% Injectable 12.5 Gram(s) IV Push once  dextrose 50% Injectable 25 Gram(s) IV Push once  enoxaparin Injectable 40 milliGRAM(s) SubCutaneous every 24 hours  furosemide    Tablet 40 milliGRAM(s) Oral daily  glucagon  Injectable 1 milliGRAM(s) IntraMuscular once  insulin lispro (ADMELOG) corrective regimen sliding scale   SubCutaneous three times a day before meals    PRN Meds  albuterol    90 MICROgram(s) HFA Inhaler 2 Puff(s) Inhalation every 6 hours PRN  dextrose Oral Gel 15 Gram(s) Oral once PRN      Case discussed with resident    Care discussed with pt/family

## 2023-07-16 ENCOUNTER — TRANSCRIPTION ENCOUNTER (OUTPATIENT)
Age: 78
End: 2023-07-16

## 2023-07-16 VITALS
TEMPERATURE: 97 F | DIASTOLIC BLOOD PRESSURE: 63 MMHG | SYSTOLIC BLOOD PRESSURE: 133 MMHG | OXYGEN SATURATION: 97 % | WEIGHT: 293 LBS | HEART RATE: 64 BPM | RESPIRATION RATE: 18 BRPM

## 2023-07-16 LAB
ANION GAP SERPL CALC-SCNC: 10 MMOL/L — SIGNIFICANT CHANGE UP (ref 7–14)
APTT BLD: 35.4 SEC — SIGNIFICANT CHANGE UP (ref 27–39.2)
BLD GP AB SCN SERPL QL: SIGNIFICANT CHANGE UP
BUN SERPL-MCNC: 27 MG/DL — HIGH (ref 10–20)
CALCIUM SERPL-MCNC: 9.5 MG/DL — SIGNIFICANT CHANGE UP (ref 8.4–10.5)
CHLORIDE SERPL-SCNC: 106 MMOL/L — SIGNIFICANT CHANGE UP (ref 98–110)
CO2 SERPL-SCNC: 24 MMOL/L — SIGNIFICANT CHANGE UP (ref 17–32)
CREAT SERPL-MCNC: 1.1 MG/DL — SIGNIFICANT CHANGE UP (ref 0.7–1.5)
EGFR: 51 ML/MIN/1.73M2 — LOW
GLUCOSE BLDC GLUCOMTR-MCNC: 124 MG/DL — HIGH (ref 70–99)
GLUCOSE BLDC GLUCOMTR-MCNC: 155 MG/DL — HIGH (ref 70–99)
GLUCOSE SERPL-MCNC: 138 MG/DL — HIGH (ref 70–99)
HCT VFR BLD CALC: 39.7 % — SIGNIFICANT CHANGE UP (ref 37–47)
HGB BLD-MCNC: 12.7 G/DL — SIGNIFICANT CHANGE UP (ref 12–16)
INR BLD: 0.99 RATIO — SIGNIFICANT CHANGE UP (ref 0.65–1.3)
MAGNESIUM SERPL-MCNC: 2.1 MG/DL — SIGNIFICANT CHANGE UP (ref 1.8–2.4)
MCHC RBC-ENTMCNC: 30 PG — SIGNIFICANT CHANGE UP (ref 27–31)
MCHC RBC-ENTMCNC: 32 G/DL — SIGNIFICANT CHANGE UP (ref 32–37)
MCV RBC AUTO: 93.9 FL — SIGNIFICANT CHANGE UP (ref 81–99)
NRBC # BLD: 0 /100 WBCS — SIGNIFICANT CHANGE UP (ref 0–0)
PLATELET # BLD AUTO: 230 K/UL — SIGNIFICANT CHANGE UP (ref 130–400)
PMV BLD: 9.4 FL — SIGNIFICANT CHANGE UP (ref 7.4–10.4)
POTASSIUM SERPL-MCNC: 4.2 MMOL/L — SIGNIFICANT CHANGE UP (ref 3.5–5)
POTASSIUM SERPL-SCNC: 4.2 MMOL/L — SIGNIFICANT CHANGE UP (ref 3.5–5)
PROTHROM AB SERPL-ACNC: 11.3 SEC — SIGNIFICANT CHANGE UP (ref 9.95–12.87)
RBC # BLD: 4.23 M/UL — SIGNIFICANT CHANGE UP (ref 4.2–5.4)
RBC # FLD: 12.7 % — SIGNIFICANT CHANGE UP (ref 11.5–14.5)
SODIUM SERPL-SCNC: 140 MMOL/L — SIGNIFICANT CHANGE UP (ref 135–146)
WBC # BLD: 7.1 K/UL — SIGNIFICANT CHANGE UP (ref 4.8–10.8)
WBC # FLD AUTO: 7.1 K/UL — SIGNIFICANT CHANGE UP (ref 4.8–10.8)

## 2023-07-16 PROCEDURE — 99239 HOSP IP/OBS DSCHRG MGMT >30: CPT

## 2023-07-16 RX ORDER — CARVEDILOL PHOSPHATE 80 MG/1
1 CAPSULE, EXTENDED RELEASE ORAL
Qty: 60 | Refills: 0
Start: 2023-07-16 | End: 2023-08-14

## 2023-07-16 RX ORDER — FUROSEMIDE 40 MG
1 TABLET ORAL
Qty: 0 | Refills: 0 | DISCHARGE
Start: 2023-07-16

## 2023-07-16 RX ORDER — NIFEDIPINE 30 MG
1 TABLET, EXTENDED RELEASE 24 HR ORAL
Refills: 0 | DISCHARGE

## 2023-07-16 RX ORDER — FUROSEMIDE 40 MG
1 TABLET ORAL
Refills: 0 | DISCHARGE

## 2023-07-16 RX ADMIN — Medication 40 MILLIGRAM(S): at 05:26

## 2023-07-16 RX ADMIN — CARVEDILOL PHOSPHATE 3.12 MILLIGRAM(S): 80 CAPSULE, EXTENDED RELEASE ORAL at 05:26

## 2023-07-16 NOTE — PROGRESS NOTE ADULT - SUBJECTIVE AND OBJECTIVE BOX
RENITA DEWEY  77y Female    CHIEF COMPLAINT:    Patient is a 77y old  Female who presents with a chief complaint of lower limb edema (15 Jul 2023 13:49)      INTERVAL HPI/OVERNIGHT EVENTS:    Patient seen and examined. No more leg swelling. Feels good. No sob    ROS: All other systems are negative.    Vital Signs:    T(F): 97.3 (23 @ 05:00), Max: 98.4 (07-15-23 @ 13:01)  HR: 64 (23 @ 05:00) (61 - 77)  BP: 133/63 (23 @ 05:00) (133/63 - 176/82)  RR: 18 (23 @ 05:00) (18 - 18)  SpO2: 97% (23 @ 05:00) (97% - 97%)  I&O's Summary    15 Jul 2023 07:01  -  2023 07:00  --------------------------------------------------------  IN: 480 mL / OUT: 1400 mL / NET: -920 mL      Daily     Daily Weight in k.3 (2023 05:00)  CAPILLARY BLOOD GLUCOSE      POCT Blood Glucose.: 124 mg/dL (2023 07:53)  POCT Blood Glucose.: 158 mg/dL (15 Jul 2023 21:40)  POCT Blood Glucose.: 111 mg/dL (15 Jul 2023 16:40)  POCT Blood Glucose.: 169 mg/dL (15 Jul 2023 11:50)      PHYSICAL EXAM:    GENERAL:  NAD  SKIN: No rashes or lesions  HENT: Atraumatic. Normocephalic. PERRL. Moist membranes.  NECK: Supple, No JVD. No lymphadenopathy.  PULMONARY: CTA B/L. No wheezing. No rales  CVS: Normal S1, S2. Rate and Rhythm are regular. No murmurs.  ABDOMEN/GI: Soft, Nontender, Nondistended; BS present  EXTREMITIES: Peripheral pulses intact. No edema B/L LE.  NEUROLOGIC:  No motor or sensory deficit.  PSYCH: Alert & oriented x 3    Consultant(s) Notes Reviewed:  [x ] YES  [ ] NO  Care Discussed with Consultants/Other Providers [ x] YES  [ ] NO    EKG reviewed  Telemetry reviewed    LABS:                        12.7   7.10  )-----------( 230      ( 2023 07:05 )             39.7     07-16    140  |  106  |  27<H>  ----------------------------<  138<H>  4.2   |  24  |  1.1    Ca    9.5      2023 07:05  Mg     2.1     07-      PT/INR - ( 2023 07:05 )   PT: 11.30 sec;   INR: 0.99 ratio         PTT - ( 2023 07:05 )  PTT:35.4 sec          RADIOLOGY & ADDITIONAL TESTS:    < from: CT Abdomen and Pelvis w/ Oral Cont and w/ IV Cont (23 @ 21:42) >    IMPRESSION:    No CT evidence for acute intra-abdominal or pelvic pathology. No ascites.    Left lower lobe 0.4 cm pulmonary nodule.    < end of copied text >    Imaging or report Personally Reviewed:  [x ] YES  [ ] NO    Medications:  Standing  budesonide  80 MICROgram(s)/formoterol 4.5 MICROgram(s) Inhaler 2 Puff(s) Inhalation two times a day  carvedilol 3.125 milliGRAM(s) Oral every 12 hours  dextrose 5%. 1000 milliLiter(s) IV Continuous <Continuous>  dextrose 5%. 1000 milliLiter(s) IV Continuous <Continuous>  dextrose 50% Injectable 12.5 Gram(s) IV Push once  dextrose 50% Injectable 25 Gram(s) IV Push once  dextrose 50% Injectable 25 Gram(s) IV Push once  enoxaparin Injectable 40 milliGRAM(s) SubCutaneous every 24 hours  furosemide    Tablet 40 milliGRAM(s) Oral daily  glucagon  Injectable 1 milliGRAM(s) IntraMuscular once  insulin lispro (ADMELOG) corrective regimen sliding scale   SubCutaneous three times a day before meals    PRN Meds  albuterol    90 MICROgram(s) HFA Inhaler 2 Puff(s) Inhalation every 6 hours PRN  dextrose Oral Gel 15 Gram(s) Oral once PRN      Case discussed with resident    Care discussed with pt/family

## 2023-07-16 NOTE — PROGRESS NOTE ADULT - PROVIDER SPECIALTY LIST ADULT
Hospitalist
Internal Medicine
Internal Medicine
Hospitalist
Internal Medicine

## 2023-07-16 NOTE — DISCHARGE NOTE NURSING/CASE MANAGEMENT/SOCIAL WORK - PATIENT PORTAL LINK FT
You can access the FollowMyHealth Patient Portal offered by [facility] by registering at the following website: [email or web address]. By joining [employer]’s FollowMyHealth portal, you will also be able to view your health information using other applications (apps) compatible with our system.

## 2023-07-16 NOTE — DISCHARGE NOTE NURSING/CASE MANAGEMENT/SOCIAL WORK - NSDCPEFALRISK_GEN_ALL_CORE
For information on Fall & Injury Prevention, visit: [email or web address] OR  [email or web address] OR  https://www.cdc.gov/steadi/patient.html

## 2023-07-16 NOTE — PROGRESS NOTE ADULT - ASSESSMENT
77 year old female with past medical history of HTN, DM, Asthma, renal cancer S/P left nephrectomy, HFpEF. She was doing relatively well until 2 weeks ago when she started noticing progressive lower limb edema started from ankles and going up to thighs associated with MENDEZ.    Acute HFpEF  DM-2 / HTN  H/O Asthma  H/O Renal cell carcinoma S/P L Nephrectomy  Morbid obesity  Borderline pulmonary HTN  B/L LE swelling                PLAN:    ·	Tele reviewed. No events  ·	CT abd/pelvis reviewed. Unremarkable. No ascites.   ·	Renal US noted. L nephrectomy. Normal R kidney.   ·	CXR showed b/l interstitial/vascular prominence  ·	Pro BNP is 171. Low, likely due to obesity. Cannot r/o CHF  ·	ECHO showed EF is 61%. Grade 1 diastolic dysfunction. Borderline pulmonary HTN  ·	Venous doppler of the LE is negative for DVT. It showed b/l baker cysts.   ·	With normal ECHO doubt pt has CHF. LE swelling could be likely due chronic venous insufficiency, vs Nifedipine vs due to Baker cyst.   ·	Nifedipine has side effect of LE swelling. Stopped Nifedipine and started her on Coreg 3.25 mg po q 12h.   ·	As the BP is still high, will increase Coreg ot 6.25 mg po q 12h and discharge her home.   ·	S/P IV Lasix. Now on PO Lasix 40 mg daily  ·	Low salt diet and water restriction to 1.5 L/D  ·	Monitor FS. On Insulin sliding scale    * Med rec reviewed. Plan of care d/w the pt. Time spent 40 minutes.

## 2023-07-16 NOTE — PROGRESS NOTE ADULT - REASON FOR ADMISSION
lower limb edema

## 2023-07-25 ENCOUNTER — OUTPATIENT (OUTPATIENT)
Dept: OUTPATIENT SERVICES | Facility: HOSPITAL | Age: 78
LOS: 1 days | End: 2023-07-25
Payer: MEDICARE

## 2023-07-25 ENCOUNTER — APPOINTMENT (OUTPATIENT)
Dept: NEPHROLOGY | Facility: CLINIC | Age: 78
End: 2023-07-25
Payer: MEDICARE

## 2023-07-25 VITALS
TEMPERATURE: 96.3 F | DIASTOLIC BLOOD PRESSURE: 71 MMHG | OXYGEN SATURATION: 97 % | SYSTOLIC BLOOD PRESSURE: 187 MMHG | BODY MASS INDEX: 45.83 KG/M2 | WEIGHT: 292 LBS | HEIGHT: 67 IN | HEART RATE: 63 BPM

## 2023-07-25 VITALS — SYSTOLIC BLOOD PRESSURE: 155 MMHG | DIASTOLIC BLOOD PRESSURE: 79 MMHG

## 2023-07-25 DIAGNOSIS — I10 ESSENTIAL (PRIMARY) HYPERTENSION: ICD-10-CM

## 2023-07-25 DIAGNOSIS — Z90.5 ACQUIRED ABSENCE OF KIDNEY: ICD-10-CM

## 2023-07-25 DIAGNOSIS — Z90.5 ACQUIRED ABSENCE OF KIDNEY: Chronic | ICD-10-CM

## 2023-07-25 DIAGNOSIS — Z00.00 ENCOUNTER FOR GENERAL ADULT MEDICAL EXAMINATION WITHOUT ABNORMAL FINDINGS: ICD-10-CM

## 2023-07-25 DIAGNOSIS — N18.31 CHRONIC KIDNEY DISEASE, STAGE 3A: ICD-10-CM

## 2023-07-25 DIAGNOSIS — Z98.890 OTHER SPECIFIED POSTPROCEDURAL STATES: Chronic | ICD-10-CM

## 2023-07-25 PROBLEM — J44.9 CHRONIC OBSTRUCTIVE PULMONARY DISEASE, UNSPECIFIED: Chronic | Status: ACTIVE | Noted: 2023-07-10

## 2023-07-25 PROCEDURE — 99203 OFFICE O/P NEW LOW 30 MIN: CPT

## 2023-07-25 PROCEDURE — 99203 OFFICE O/P NEW LOW 30 MIN: CPT | Mod: GC

## 2023-07-25 NOTE — ASSESSMENT
[FreeTextEntry1] : 78 y/o woman w PMHx of HTN, DM, asthma, HFpEF G1DD and borderline pulm HTN as of 3/2023, s/p L nephrectomy for RCC, referred to Nephro for reported MARIELLE after [facility] admission for BLE swelling, then admission in 7/2023 at [facility] w most recent labs 7/12/23 showing Cr 1.1 and eGFR 58, presents for establishment of care. \par \par #RCC s/p L nephrectomy\par - lost to Onc f/u \par - Advised Onc f/u \par \par #CKD 3a\par Unclear baseline Cr, recent in hospital of Cr 1.1 and eGFR 50s \par RBUS in 7/2023 WNL \par CTAP in 7/2023 WNL \par - urine prot/cr ratio \par - start lisinopril 10mg for BP control, 180s initially w repeat in office of 155/79, HR 66\par - RTC 3 months\par - BP log advised \par \par #HTN - cont carvedilol w HR 60s, add lisinopril \par #DM - A1c reportedly in appropriate range\par #HFpEF - cont Cardio f/u, lasix, carvedilol, lisinopril

## 2023-07-25 NOTE — HISTORY OF PRESENT ILLNESS
[FreeTextEntry1] : 76 y/o woman w PMHx of HTN, DM, asthma, HFpEF G1DD and borderline pulm HTN as of 3/2023, s/p L nephrectomy for RCC, referred to Nephro for reported MARIELLE after [facility] admission for BLE swelling, then admission in 7/2023 at [facility] w most recent labs 7/12/23 showing Cr 1.1 and eGFR 58, presents for establishment of care.

## 2023-07-25 NOTE — END OF VISIT
[] : Resident [FreeTextEntry3] : # RCC sp left nephrectomy/ CKD 3A / HTN \par will check CMP UA and spot \par will add ACE inh to control BP / will follow K \par RTC in 3 months for follow up

## 2023-07-26 LAB — GLUCOSE BLDC GLUCOMTR-MCNC: 165 MG/DL — HIGH (ref 70–99)

## 2023-08-01 ENCOUNTER — APPOINTMENT (OUTPATIENT)
Dept: ENDOCRINOLOGY | Facility: CLINIC | Age: 78
End: 2023-08-01
Payer: MEDICARE

## 2023-08-01 DIAGNOSIS — I50.9 HEART FAILURE, UNSPECIFIED: ICD-10-CM

## 2023-08-01 DIAGNOSIS — Z85.528 PERSONAL HISTORY OF OTHER MALIGNANT NEOPLASM OF KIDNEY: ICD-10-CM

## 2023-08-01 DIAGNOSIS — Z87.09 PERSONAL HISTORY OF OTHER DISEASES OF THE RESPIRATORY SYSTEM: ICD-10-CM

## 2023-08-01 PROBLEM — C64.9 MALIGNANT NEOPLASM OF UNSPECIFIED KIDNEY, EXCEPT RENAL PELVIS: Chronic | Status: ACTIVE | Noted: 2023-07-10

## 2023-08-01 PROBLEM — E11.9 TYPE 2 DIABETES MELLITUS WITHOUT COMPLICATIONS: Chronic | Status: ACTIVE | Noted: 2023-07-10

## 2023-08-01 PROCEDURE — 99204 OFFICE O/P NEW MOD 45 MIN: CPT

## 2023-08-01 RX ORDER — NIFEDIPINE 60 MG/1
60 TABLET, EXTENDED RELEASE ORAL DAILY
Refills: 0 | Status: COMPLETED | COMMUNITY
End: 2023-08-01

## 2023-08-01 RX ORDER — PIOGLITAZONE HYDROCHLORIDE 45 MG/1
45 TABLET ORAL DAILY
Refills: 0 | Status: DISCONTINUED | COMMUNITY
End: 2023-08-01

## 2023-08-01 RX ORDER — BLOOD-GLUCOSE METER
W/DEVICE KIT MISCELLANEOUS
Qty: 1 | Refills: 0 | Status: ACTIVE | COMMUNITY
Start: 2023-08-01 | End: 1900-01-01

## 2023-08-14 VITALS
RESPIRATION RATE: 18 BRPM | OXYGEN SATURATION: 94 % | HEIGHT: 67 IN | DIASTOLIC BLOOD PRESSURE: 65 MMHG | SYSTOLIC BLOOD PRESSURE: 145 MMHG | BODY MASS INDEX: 45.83 KG/M2 | WEIGHT: 292 LBS | HEART RATE: 56 BPM

## 2023-08-14 PROBLEM — I50.9 HEART FAILURE: Status: ACTIVE | Noted: 2023-03-04

## 2023-08-14 PROBLEM — Z87.09 HISTORY OF ASTHMA: Status: RESOLVED | Noted: 2023-03-04 | Resolved: 2023-08-14

## 2023-08-14 PROBLEM — Z85.528 HISTORY OF RENAL CELL CARCINOMA: Status: RESOLVED | Noted: 2023-03-04 | Resolved: 2023-08-14

## 2023-08-14 NOTE — PHYSICAL EXAM
[Alert] : alert [Well Nourished] : well nourished [Obese] : obese [No Acute Distress] : no acute distress [Well Developed] : well developed [Normal Sclera/Conjunctiva] : normal sclera/conjunctiva [EOMI] : extra ocular movement intact [PERRL] : pupils equal, round and reactive to light [No Proptosis] : no proptosis [Normal Outer Ear/Nose] : the ears and nose were normal in appearance [Normal Hearing] : hearing was normal [Supple] : the neck was supple [Thyroid Not Enlarged] : the thyroid was not enlarged [No Respiratory Distress] : no respiratory distress [No Accessory Muscle Use] : no accessory muscle use [Normal Rate and Effort] : normal respiratory rate and effort [Clear to Auscultation] : lungs were clear to auscultation bilaterally [Normal S1, S2] : normal S1 and S2 [Normal Rate] : heart rate was normal [Regular Rhythm] : with a regular rhythm [No Edema] : no peripheral edema [Pedal Pulses Normal] : the pedal pulses are present [Normal Bowel Sounds] : normal bowel sounds [Not Tender] : non-tender [Not Distended] : not distended [Soft] : abdomen soft [Normal Anterior Cervical Nodes] : no anterior cervical lymphadenopathy [Normal Posterior Cervical Nodes] : no posterior cervical lymphadenopathy [No Spinal Tenderness] : no spinal tenderness [Spine Straight] : spine straight [No Stigmata of Cushings Syndrome] : no stigmata of Cushings Syndrome [Normal Gait] : normal gait [Normal Strength/Tone] : muscle strength and tone were normal [No Rash] : no rash [Acanthosis Nigricans] : no acanthosis nigricans [Foot Ulcers] : no foot ulcers [Normal Reflexes] : deep tendon reflexes were 2+ and symmetric [No Tremors] : no tremors [Oriented x3] : oriented to person, place, and time [Normal Affect] : the affect was normal [Normal Mood] : the mood was normal [de-identified] : DM2, OBESITY

## 2023-08-14 NOTE — PAST MEDICAL HISTORY
[Surgical Menopause] : The patient is in surgical menopause [Menopause Age____] : age at menopause was [unfilled] [Total Preg ___] : G[unfilled] [Live Births ___] : P[unfilled]  [Full Term ___] : Full Term: [unfilled] [Living ___] : Living: [unfilled]

## 2023-08-14 NOTE — HISTORY OF PRESENT ILLNESS
[FreeTextEntry1] : Patient came with granddaughter today. She hsaid she has history of HN, DM and was in hospital for fluid volume, Right side heart failure, and stage 3 CKD.  PATIENT TEACHING OF DIET AND EXERCISE AND DISCONTINUE PIOGLITAZONE TODAY AND STARTED fARXIGA 10 MG DAILY. DISUCSSED SHE NEEDS TO DRINK WATER AND NEED GOOD PERICARE 2 X A DAY. SHE STATED HER HGBA1C IS 5.6. SHE HAS BP MONITOR AND WILL LOG HER BP.

## 2023-08-14 NOTE — ASSESSMENT
[Diabetes Foot Care] : diabetes foot care [Long Term Vascular Complications] : long term vascular complications of diabetes [Carbohydrate Consistent Diet] : carbohydrate consistent diet [Importance of Diet and Exercise] : importance of diet and exercise to improve glycemic control, achieve weight loss and improve cardiovascular health [Exercise/Effect on Glucose] : exercise/effect on glucose [Hypoglycemia Management] : hypoglycemia management [Ketone Testing] : ketone testing [Self Monitoring of Blood Glucose] : self monitoring of blood glucose [Sick-Day Management] : sick-day management [Retinopathy Screening] : Patient was referred to ophthalmology for retinopathy screening [Weight Loss] : weight loss [Diabetic Medications] : Risks and benefits of diabetic medications were discussed [Other____] : [unfilled] [FreeTextEntry4] : [address] ADA DIET, LOSE WEIGHT [FreeTextEntry1] : DIET AND EXERCISE. D/C PIOGLITAZONE AND START FARXIGA 10 MG DAILY

## 2023-08-14 NOTE — REASON FOR VISIT
[Initial Evaluation] : an initial evaluation [DM Type 2] : DM Type 2 [Weight Management/Obesity] : weight management/obesity [Family Member] : family member [Other: _____] : [unfilled] [FreeTextEntry2] : Dr. Faye

## 2023-10-11 ENCOUNTER — LABORATORY RESULT (OUTPATIENT)
Age: 78
End: 2023-10-11

## 2023-10-11 ENCOUNTER — OUTPATIENT (OUTPATIENT)
Dept: OUTPATIENT SERVICES | Facility: HOSPITAL | Age: 78
LOS: 1 days | End: 2023-10-11

## 2023-10-11 DIAGNOSIS — Z98.890 OTHER SPECIFIED POSTPROCEDURAL STATES: Chronic | ICD-10-CM

## 2023-10-11 DIAGNOSIS — E11.9 TYPE 2 DIABETES MELLITUS WITHOUT COMPLICATIONS: ICD-10-CM

## 2023-10-11 DIAGNOSIS — Z90.5 ACQUIRED ABSENCE OF KIDNEY: Chronic | ICD-10-CM

## 2023-10-12 DIAGNOSIS — E11.9 TYPE 2 DIABETES MELLITUS WITHOUT COMPLICATIONS: ICD-10-CM

## 2023-10-12 LAB
ALBUMIN SERPL ELPH-MCNC: 4.6 G/DL
ALP BLD-CCNC: 110 U/L
ALT SERPL-CCNC: 8 U/L
ANION GAP SERPL CALC-SCNC: 18 MMOL/L
APPEARANCE: CLEAR
AST SERPL-CCNC: 15 U/L
BILIRUB SERPL-MCNC: 0.7 MG/DL
BILIRUBIN URINE: NEGATIVE
BLOOD URINE: ABNORMAL
BUN SERPL-MCNC: 14 MG/DL
C PEPTIDE SERPL-MCNC: 1.8 NG/ML
CALCIUM SERPL-MCNC: 9.5 MG/DL
CHLORIDE SERPL-SCNC: 103 MMOL/L
CHOLEST SERPL-MCNC: 173 MG/DL
CK SERPL-CCNC: 121 U/L
CO2 SERPL-SCNC: 22 MMOL/L
COLOR: YELLOW
CREAT SERPL-MCNC: 1.2 MG/DL
CREAT SPEC-SCNC: 36 MG/DL
EGFR: 46 ML/MIN/1.73M2
ESTIMATED AVERAGE GLUCOSE: 151 MG/DL
GLUCOSE QUALITATIVE U: >=1000 MG/DL
GLUCOSE SERPL-MCNC: 115 MG/DL
HBA1C MFR BLD HPLC: 6.9 %
HDLC SERPL-MCNC: 83 MG/DL
KETONES URINE: NEGATIVE MG/DL
LDLC SERPL CALC-MCNC: 74 MG/DL
LEUKOCYTE ESTERASE URINE: ABNORMAL
MICROALBUMIN 24H UR DL<=1MG/L-MCNC: 17 MG/DL
MICROALBUMIN/CREAT 24H UR-RTO: 470 MG/G
NITRITE URINE: NEGATIVE
NONHDLC SERPL-MCNC: 90 MG/DL
PH URINE: 6.5
POTASSIUM SERPL-SCNC: 4.9 MMOL/L
PROT SERPL-MCNC: 7 G/DL
PROTEIN URINE: 30 MG/DL
SODIUM SERPL-SCNC: 143 MMOL/L
SPECIFIC GRAVITY URINE: 1.01
T4 FREE SERPL-MCNC: 1.6 NG/DL
THYROGLOB AB SERPL-ACNC: NORMAL
THYROPEROXIDASE AB SERPL IA-ACNC: <10 IU/ML
TRIGL SERPL-MCNC: 80 MG/DL
TSH SERPL-ACNC: 1.98 UIU/ML
UROBILINOGEN URINE: 0.2 MG/DL
VIT B12 SERPL-MCNC: 495 PG/ML

## 2023-10-21 ENCOUNTER — RX RENEWAL (OUTPATIENT)
Age: 78
End: 2023-10-21

## 2023-11-16 ENCOUNTER — APPOINTMENT (OUTPATIENT)
Dept: ENDOCRINOLOGY | Facility: CLINIC | Age: 78
End: 2023-11-16
Payer: MEDICARE

## 2023-11-16 VITALS
SYSTOLIC BLOOD PRESSURE: 150 MMHG | HEIGHT: 67 IN | WEIGHT: 284 LBS | RESPIRATION RATE: 18 BRPM | HEART RATE: 74 BPM | DIASTOLIC BLOOD PRESSURE: 76 MMHG | OXYGEN SATURATION: 96 % | BODY MASS INDEX: 44.57 KG/M2

## 2023-11-16 PROCEDURE — 99214 OFFICE O/P EST MOD 30 MIN: CPT

## 2023-11-16 RX ORDER — METFORMIN HYDROCHLORIDE 500 MG/1
500 TABLET, COATED ORAL TWICE DAILY
Refills: 0 | Status: COMPLETED | COMMUNITY
End: 2023-11-16

## 2023-11-26 ENCOUNTER — RX RENEWAL (OUTPATIENT)
Age: 78
End: 2023-11-26

## 2023-11-27 ENCOUNTER — RX RENEWAL (OUTPATIENT)
Age: 78
End: 2023-11-27

## 2023-12-26 ENCOUNTER — APPOINTMENT (OUTPATIENT)
Dept: NEPHROLOGY | Facility: CLINIC | Age: 78
End: 2023-12-26

## 2024-03-04 ENCOUNTER — APPOINTMENT (OUTPATIENT)
Dept: ENDOCRINOLOGY | Facility: CLINIC | Age: 79
End: 2024-03-04
Payer: MEDICARE

## 2024-03-04 VITALS
RESPIRATION RATE: 18 BRPM | HEART RATE: 84 BPM | SYSTOLIC BLOOD PRESSURE: 140 MMHG | HEIGHT: 67 IN | DIASTOLIC BLOOD PRESSURE: 74 MMHG | OXYGEN SATURATION: 98 % | BODY MASS INDEX: 45.2 KG/M2 | WEIGHT: 288 LBS

## 2024-03-04 DIAGNOSIS — R79.89 OTHER SPECIFIED ABNORMAL FINDINGS OF BLOOD CHEMISTRY: ICD-10-CM

## 2024-03-04 LAB
GLUCOSE BLDC GLUCOMTR-MCNC: 187
HBA1C MFR BLD HPLC: 7.1

## 2024-03-04 PROCEDURE — 82962 GLUCOSE BLOOD TEST: CPT

## 2024-03-04 PROCEDURE — 83036 HEMOGLOBIN GLYCOSYLATED A1C: CPT | Mod: QW

## 2024-03-04 PROCEDURE — 99214 OFFICE O/P EST MOD 30 MIN: CPT

## 2024-03-04 RX ORDER — ERGOCALCIFEROL 1.25 MG/1
1.25 MG CAPSULE, LIQUID FILLED ORAL
Qty: 13 | Refills: 3 | Status: ACTIVE | COMMUNITY
Start: 2023-09-26 | End: 1900-01-01

## 2024-03-04 RX ORDER — SEMAGLUTIDE 1.34 MG/ML
4 INJECTION, SOLUTION SUBCUTANEOUS
Qty: 3 | Refills: 2 | Status: ACTIVE | COMMUNITY
Start: 2023-12-14 | End: 1900-01-01

## 2024-03-04 RX ORDER — FLUTICASONE FUROATE AND VILANTEROL TRIFENATATE 100; 25 UG/1; UG/1
100-25 POWDER RESPIRATORY (INHALATION)
Qty: 60 | Refills: 0 | Status: ACTIVE | COMMUNITY
Start: 2024-02-22

## 2024-03-04 RX ORDER — TIRZEPATIDE 5 MG/.5ML
5 INJECTION, SOLUTION SUBCUTANEOUS
Qty: 12 | Refills: 1 | Status: COMPLETED | COMMUNITY
Start: 2023-11-16 | End: 2024-03-04

## 2024-03-04 NOTE — ASSESSMENT
[Diabetes Foot Care] : diabetes foot care [Carbohydrate Consistent Diet] : carbohydrate consistent diet [Long Term Vascular Complications] : long term vascular complications of diabetes [Importance of Diet and Exercise] : importance of diet and exercise to improve glycemic control, achieve weight loss and improve cardiovascular health [Exercise/Effect on Glucose] : exercise/effect on glucose [Glucagon Use] : glucagon use [Hypoglycemia Management] : hypoglycemia management [Ketone Testing] : ketone testing [Self Monitoring of Blood Glucose] : self monitoring of blood glucose [Injection Technique, Storage, Sharps Disposal] : injection technique, storage, and sharps disposal [Sick-Day Management] : sick-day management [Weight Loss] : weight loss [Retinopathy Screening] : Patient was referred to ophthalmology for retinopathy screening [Diabetic Medications] : Risks and benefits of diabetic medications were discussed [Other____] : [unfilled] [FreeTextEntry4] : [address] diet, exercise [FreeTextEntry3] : Farxiga 10 mg daily [FreeTextEntry1] : DM2  Farxiga 10mg daily Ozempic 0.5mg weekly, will increase to 1 mg weekly

## 2024-03-04 NOTE — REASON FOR VISIT
[Follow - Up] : a follow-up visit [DM Type 2] : DM Type 2 [Weight Management/Obesity] : weight management/obesity [Family Member] : family member [Other: _____] : [unfilled]

## 2024-03-04 NOTE — HISTORY OF PRESENT ILLNESS
[Finger Stick] : Finger Stick: Yes [Continuous Glucose Monitoring] : Continuous Glucose Monitoring: No [Hypoglycemia] : Patient is not hypoglycemic. [FreeTextEntry9] : 160-722 [FreeTextEntry1] : testing 1-2 x a day

## 2024-03-04 NOTE — PHYSICAL EXAM
[Alert] : alert [Well Nourished] : well nourished [Obese] : obese [No Acute Distress] : no acute distress [Well Developed] : well developed [Normal Sclera/Conjunctiva] : normal sclera/conjunctiva [EOMI] : extra ocular movement intact [PERRL] : pupils equal, round and reactive to light [No Proptosis] : no proptosis [Normal Hearing] : hearing was normal [Normal Outer Ear/Nose] : the ears and nose were normal in appearance [Supple] : the neck was supple [Thyroid Not Enlarged] : the thyroid was not enlarged [No Respiratory Distress] : no respiratory distress [No Accessory Muscle Use] : no accessory muscle use [Clear to Auscultation] : lungs were clear to auscultation bilaterally [Normal Rate and Effort] : normal respiratory rate and effort [Normal S1, S2] : normal S1 and S2 [Regular Rhythm] : with a regular rhythm [Normal Rate] : heart rate was normal [Carotids Normal] : carotid pulses were normal with no bruits [No Edema] : no peripheral edema [Pedal Pulses Normal] : the pedal pulses are present [Normal Bowel Sounds] : normal bowel sounds [Not Tender] : non-tender [Not Distended] : not distended [Soft] : abdomen soft [Normal Anterior Cervical Nodes] : no anterior cervical lymphadenopathy [No CVA Tenderness] : no ~M costovertebral angle tenderness [Spine Straight] : spine straight [No Spinal Tenderness] : no spinal tenderness [Normal Gait] : normal gait [No Stigmata of Cushings Syndrome] : no stigmata of Cushings Syndrome [Normal Strength/Tone] : muscle strength and tone were normal [No Rash] : no rash [Normal Reflexes] : deep tendon reflexes were 2+ and symmetric [No Tremors] : no tremors [Oriented x3] : oriented to person, place, and time [Normal Affect] : the affect was normal [Normal Mood] : the mood was normal [Kyphosis] : no kyphosis present [Scoliosis] : no scoliosis [Acanthosis Nigricans] : no acanthosis nigricans [Foot Ulcers] : no foot ulcers [de-identified] : Has walker to ambulate [de-identified] : DM2, OBESITY

## 2024-03-04 NOTE — DATA REVIEWED
[No studies available for review at this time.] : No studies available for review at this time. [FreeTextEntry1] : poct gl;ucose 187, hgba1c 7.1

## 2024-03-26 ENCOUNTER — APPOINTMENT (OUTPATIENT)
Dept: NEPHROLOGY | Facility: CLINIC | Age: 79
End: 2024-03-26

## 2024-03-29 ENCOUNTER — APPOINTMENT (OUTPATIENT)
Dept: CARDIOLOGY | Facility: CLINIC | Age: 79
End: 2024-03-29

## 2024-04-14 NOTE — ED ADULT NURSE NOTE - BIRTH SEX
Surgery/POD#: POD#13 s/p R AKA  Orientation: A&Ox4  ABNL VS/O2: VSS on 2L O2  ABNL Labs: Hgb 6.7, Na 130, K+/Mag/Phos replacement protocols, recheck in AM  Pain Management: Denied pain  Bowel/Bladder: on hemodialysis, incontinent of bowel at times, x1 small loose BM this shift  Drains: RUE PICC SL. Dialysis port.  Wounds/incisions: R AKA dressing CDI, bilateral groin sites. Blanchable redness to coccyx-Mepilex applied.  Diet: Gluten free w/ 1200 ml fluid restriction  Activity Level: Ax2, not OOB. Weight shifting encouraged.  Anticipated  DC Date: pending ARU   Significant Information:   Female

## 2024-04-16 ENCOUNTER — APPOINTMENT (OUTPATIENT)
Dept: CARDIOLOGY | Facility: CLINIC | Age: 79
End: 2024-04-16
Payer: MEDICARE

## 2024-04-16 VITALS
HEART RATE: 50 BPM | WEIGHT: 280 LBS | SYSTOLIC BLOOD PRESSURE: 160 MMHG | DIASTOLIC BLOOD PRESSURE: 90 MMHG | HEIGHT: 67 IN | BODY MASS INDEX: 43.95 KG/M2

## 2024-04-16 VITALS — DIASTOLIC BLOOD PRESSURE: 92 MMHG | SYSTOLIC BLOOD PRESSURE: 152 MMHG

## 2024-04-16 DIAGNOSIS — Z90.5 ACQUIRED ABSENCE OF KIDNEY: ICD-10-CM

## 2024-04-16 DIAGNOSIS — R60.0 LOCALIZED EDEMA: ICD-10-CM

## 2024-04-16 PROCEDURE — 93000 ELECTROCARDIOGRAM COMPLETE: CPT

## 2024-04-16 PROCEDURE — 99214 OFFICE O/P EST MOD 30 MIN: CPT | Mod: 25

## 2024-04-16 RX ORDER — FLUTICASONE PROPIONATE 220 UG/1
220 AEROSOL, METERED RESPIRATORY (INHALATION)
Qty: 12 | Refills: 0 | Status: DISCONTINUED | COMMUNITY
Start: 2023-09-26 | End: 2024-04-16

## 2024-04-16 RX ORDER — FUROSEMIDE 20 MG/1
20 TABLET ORAL
Qty: 90 | Refills: 1 | Status: DISCONTINUED | COMMUNITY
Start: 2023-03-17 | End: 2024-04-16

## 2024-04-16 RX ORDER — LISINOPRIL 10 MG/1
10 TABLET ORAL DAILY
Qty: 1 | Refills: 3 | Status: DISCONTINUED | COMMUNITY
Start: 2023-07-25 | End: 2024-04-16

## 2024-04-16 RX ORDER — FUROSEMIDE 40 MG/1
40 TABLET ORAL DAILY
Qty: 90 | Refills: 0 | Status: DISCONTINUED | COMMUNITY
End: 2024-04-16

## 2024-04-16 RX ORDER — LISINOPRIL 10 MG/1
10 TABLET ORAL
Qty: 90 | Refills: 3 | Status: ACTIVE | COMMUNITY
Start: 2024-04-16 | End: 1900-01-01

## 2024-04-16 NOTE — PHYSICAL EXAM
[Well Developed] : well developed [Well Nourished] : well nourished [No Acute Distress] : no acute distress [Normal Conjunctiva] : normal conjunctiva [Normal Venous Pressure] : normal venous pressure [No Carotid Bruit] : no carotid bruit [Normal S1, S2] : normal S1, S2 [No Murmur] : no murmur [No Rub] : no rub [No Gallop] : no gallop [Clear Lung Fields] : clear lung fields [Good Air Entry] : good air entry [No Respiratory Distress] : no respiratory distress  [Soft] : abdomen soft [Non Tender] : non-tender [Normal Bowel Sounds] : normal bowel sounds [No Edema] : no edema [No Cyanosis] : no cyanosis [No Clubbing] : no clubbing [Moves all extremities] : moves all extremities [No Focal Deficits] : no focal deficits [Normal Speech] : normal speech [Alert and Oriented] : alert and oriented [Normal memory] : normal memory

## 2024-04-16 NOTE — ASSESSMENT
[FreeTextEntry1] : --- 78-year-old female with a history of hypertension, asthma, lower extremity edema, diabetes, CKD, history of left nephrectomy who presents for follow-up.  Hypertension -Start lisinopril 10 mg daily. Obtain a BMP in 1-2 weeks prior to her close nephrology follow-up. -Discussed lifestyle modifications including a heart healthy diet. Check blood pressure occasionally at home.  RTC in one year

## 2024-04-16 NOTE — HISTORY OF PRESENT ILLNESS
[FreeTextEntry1] : 78-year-old female with a history of hypertension, lower extremity edema, diabetes, CKD, history of left nephrectomy who presents for follow-up  Since her last visit she has been doing well. She reports no significant cardiac signs or symptoms. She states that her lower extremity edema has resolved and has not been an issue for many months. She does not check her blood pressure at home. It appears she was prescribed lisinopril by her nephrologist in July, but she does not recall this medicine and states she does not take it. She has had no chest pain, palpitations, lightheadedness, syncope, orthopnea, PND or lower extremity edema. She occasionally experiences sporadic shortness of breath which is relieved by her inhaler.   Labs 10/2023: , HDL 83, LDL 74. Creatinine 1.2, GFR 46. LFTs WNL. TSH WNL. A1c 6.9. Echocardiogram 3/2023: Normal LV size and function with EF 63%. Normal LV diastolic function. Estimated PASP 46 mmHg.

## 2024-05-21 ENCOUNTER — RX RENEWAL (OUTPATIENT)
Age: 79
End: 2024-05-21

## 2024-05-21 RX ORDER — CARVEDILOL 6.25 MG/1
6.25 TABLET, FILM COATED ORAL TWICE DAILY
Qty: 180 | Refills: 0 | Status: ACTIVE | COMMUNITY
Start: 2023-08-01 | End: 1900-01-01

## 2024-06-03 ENCOUNTER — RX RENEWAL (OUTPATIENT)
Age: 79
End: 2024-06-03

## 2024-06-24 ENCOUNTER — APPOINTMENT (OUTPATIENT)
Dept: ENDOCRINOLOGY | Facility: CLINIC | Age: 79
End: 2024-06-24
Payer: MEDICARE

## 2024-06-24 VITALS
HEIGHT: 67 IN | SYSTOLIC BLOOD PRESSURE: 140 MMHG | RESPIRATION RATE: 18 BRPM | OXYGEN SATURATION: 97 % | BODY MASS INDEX: 43 KG/M2 | DIASTOLIC BLOOD PRESSURE: 74 MMHG | WEIGHT: 274 LBS | HEART RATE: 76 BPM

## 2024-06-24 DIAGNOSIS — E66.01 MORBID (SEVERE) OBESITY DUE TO EXCESS CALORIES: ICD-10-CM

## 2024-06-24 DIAGNOSIS — E11.9 TYPE 2 DIABETES MELLITUS W/OUT COMPLICATIONS: ICD-10-CM

## 2024-06-24 DIAGNOSIS — N18.31 CHRONIC KIDNEY DISEASE, STAGE 3A: ICD-10-CM

## 2024-06-24 PROCEDURE — 99214 OFFICE O/P EST MOD 30 MIN: CPT

## 2024-06-24 RX ORDER — DAPAGLIFLOZIN 10 MG/1
10 TABLET, FILM COATED ORAL
Qty: 90 | Refills: 2 | Status: ACTIVE | COMMUNITY
Start: 2023-08-01 | End: 1900-01-01

## 2024-06-24 RX ORDER — BLOOD SUGAR DIAGNOSTIC
STRIP MISCELLANEOUS
Qty: 100 | Refills: 3 | Status: ACTIVE | COMMUNITY
Start: 2023-08-01 | End: 1900-01-01

## 2024-07-02 ENCOUNTER — OUTPATIENT (OUTPATIENT)
Dept: OUTPATIENT SERVICES | Facility: HOSPITAL | Age: 79
LOS: 1 days | End: 2024-07-02
Payer: MEDICARE

## 2024-07-02 ENCOUNTER — APPOINTMENT (OUTPATIENT)
Dept: NEPHROLOGY | Facility: CLINIC | Age: 79
End: 2024-07-02
Payer: MEDICARE

## 2024-07-02 VITALS
WEIGHT: 272 LBS | DIASTOLIC BLOOD PRESSURE: 78 MMHG | SYSTOLIC BLOOD PRESSURE: 180 MMHG | OXYGEN SATURATION: 98 % | TEMPERATURE: 97.8 F | BODY MASS INDEX: 42.69 KG/M2 | HEIGHT: 67 IN | HEART RATE: 68 BPM

## 2024-07-02 VITALS — SYSTOLIC BLOOD PRESSURE: 180 MMHG | DIASTOLIC BLOOD PRESSURE: 87 MMHG

## 2024-07-02 DIAGNOSIS — I10 ESSENTIAL (PRIMARY) HYPERTENSION: ICD-10-CM

## 2024-07-02 DIAGNOSIS — Z00.00 ENCOUNTER FOR GENERAL ADULT MEDICAL EXAMINATION WITHOUT ABNORMAL FINDINGS: ICD-10-CM

## 2024-07-02 DIAGNOSIS — Z98.890 OTHER SPECIFIED POSTPROCEDURAL STATES: Chronic | ICD-10-CM

## 2024-07-02 DIAGNOSIS — Z90.5 ACQUIRED ABSENCE OF KIDNEY: Chronic | ICD-10-CM

## 2024-07-02 PROCEDURE — 99214 OFFICE O/P EST MOD 30 MIN: CPT

## 2024-07-02 RX ORDER — AMLODIPINE BESYLATE 2.5 MG/1
2.5 TABLET ORAL DAILY
Qty: 90 | Refills: 0 | Status: ACTIVE | COMMUNITY
Start: 2024-07-02 | End: 1900-01-01

## 2024-07-02 RX ORDER — LISINOPRIL 20 MG/1
20 TABLET ORAL DAILY
Qty: 2 | Refills: 0 | Status: ACTIVE | COMMUNITY
Start: 2024-07-02 | End: 1900-01-01

## 2024-07-03 DIAGNOSIS — Z90.5 ACQUIRED ABSENCE OF KIDNEY: ICD-10-CM

## 2024-07-03 DIAGNOSIS — I10 ESSENTIAL (PRIMARY) HYPERTENSION: ICD-10-CM

## 2024-07-03 DIAGNOSIS — N18.31 CHRONIC KIDNEY DISEASE, STAGE 3A: ICD-10-CM

## 2024-07-03 DIAGNOSIS — E11.9 TYPE 2 DIABETES MELLITUS WITHOUT COMPLICATIONS: ICD-10-CM

## 2024-07-03 DIAGNOSIS — E66.01 MORBID (SEVERE) OBESITY DUE TO EXCESS CALORIES: ICD-10-CM

## 2024-07-03 DIAGNOSIS — Z85.528 PERSONAL HISTORY OF OTHER MALIGNANT NEOPLASM OF KIDNEY: ICD-10-CM

## 2024-07-03 DIAGNOSIS — R79.89 OTHER SPECIFIED ABNORMAL FINDINGS OF BLOOD CHEMISTRY: ICD-10-CM

## 2024-09-01 ENCOUNTER — INPATIENT (INPATIENT)
Facility: HOSPITAL | Age: 79
LOS: 1 days | Discharge: HOME | DRG: 203 | End: 2024-09-03
Attending: INTERNAL MEDICINE | Admitting: STUDENT IN AN ORGANIZED HEALTH CARE EDUCATION/TRAINING PROGRAM
Payer: MEDICARE

## 2024-09-01 VITALS
WEIGHT: 279.99 LBS | OXYGEN SATURATION: 91 % | HEART RATE: 84 BPM | SYSTOLIC BLOOD PRESSURE: 201 MMHG | RESPIRATION RATE: 18 BRPM | TEMPERATURE: 98 F | DIASTOLIC BLOOD PRESSURE: 79 MMHG

## 2024-09-01 DIAGNOSIS — J45.901 UNSPECIFIED ASTHMA WITH (ACUTE) EXACERBATION: ICD-10-CM

## 2024-09-01 DIAGNOSIS — Z90.5 ACQUIRED ABSENCE OF KIDNEY: Chronic | ICD-10-CM

## 2024-09-01 DIAGNOSIS — Z98.890 OTHER SPECIFIED POSTPROCEDURAL STATES: Chronic | ICD-10-CM

## 2024-09-01 LAB
ALBUMIN SERPL ELPH-MCNC: 4.3 G/DL — SIGNIFICANT CHANGE UP (ref 3.5–5.2)
ALP SERPL-CCNC: 138 U/L — HIGH (ref 30–115)
ALT FLD-CCNC: 9 U/L — SIGNIFICANT CHANGE UP (ref 0–41)
ANION GAP SERPL CALC-SCNC: 12 MMOL/L — SIGNIFICANT CHANGE UP (ref 7–14)
AST SERPL-CCNC: 17 U/L — SIGNIFICANT CHANGE UP (ref 0–41)
BASE EXCESS BLDV CALC-SCNC: 2.8 MMOL/L — SIGNIFICANT CHANGE UP (ref -2–3)
BASE EXCESS BLDV CALC-SCNC: 4 MMOL/L — HIGH (ref -2–3)
BASOPHILS # BLD AUTO: 0.03 K/UL — SIGNIFICANT CHANGE UP (ref 0–0.2)
BASOPHILS NFR BLD AUTO: 0.3 % — SIGNIFICANT CHANGE UP (ref 0–1)
BILIRUB SERPL-MCNC: 0.6 MG/DL — SIGNIFICANT CHANGE UP (ref 0.2–1.2)
BUN SERPL-MCNC: 16 MG/DL — SIGNIFICANT CHANGE UP (ref 10–20)
CA-I SERPL-SCNC: 1.28 MMOL/L — SIGNIFICANT CHANGE UP (ref 1.15–1.33)
CA-I SERPL-SCNC: 1.29 MMOL/L — SIGNIFICANT CHANGE UP (ref 1.15–1.33)
CALCIUM SERPL-MCNC: 10.4 MG/DL — SIGNIFICANT CHANGE UP (ref 8.4–10.5)
CHLORIDE SERPL-SCNC: 103 MMOL/L — SIGNIFICANT CHANGE UP (ref 98–110)
CO2 SERPL-SCNC: 27 MMOL/L — SIGNIFICANT CHANGE UP (ref 17–32)
CREAT SERPL-MCNC: 1.4 MG/DL — SIGNIFICANT CHANGE UP (ref 0.7–1.5)
EGFR: 38 ML/MIN/1.73M2 — LOW
EOSINOPHIL # BLD AUTO: 0.52 K/UL — SIGNIFICANT CHANGE UP (ref 0–0.7)
EOSINOPHIL NFR BLD AUTO: 5.6 % — SIGNIFICANT CHANGE UP (ref 0–8)
FLUAV AG NPH QL: SIGNIFICANT CHANGE UP
FLUBV AG NPH QL: SIGNIFICANT CHANGE UP
GAS PNL BLDV: 133 MMOL/L — LOW (ref 136–145)
GAS PNL BLDV: 136 MMOL/L — SIGNIFICANT CHANGE UP (ref 136–145)
GAS PNL BLDV: SIGNIFICANT CHANGE UP
GAS PNL BLDV: SIGNIFICANT CHANGE UP
GLUCOSE BLDC GLUCOMTR-MCNC: 203 MG/DL — HIGH (ref 70–99)
GLUCOSE SERPL-MCNC: 135 MG/DL — HIGH (ref 70–99)
HCO3 BLDV-SCNC: 31 MMOL/L — HIGH (ref 22–29)
HCO3 BLDV-SCNC: 32 MMOL/L — HIGH (ref 22–29)
HCT VFR BLD CALC: 50.1 % — HIGH (ref 37–47)
HCT VFR BLDA CALC: 49 % — HIGH (ref 34.5–46.5)
HGB BLD CALC-MCNC: 16.3 G/DL — HIGH (ref 11.7–16.1)
HGB BLD-MCNC: 16 G/DL — SIGNIFICANT CHANGE UP (ref 12–16)
IMM GRANULOCYTES NFR BLD AUTO: 0.3 % — SIGNIFICANT CHANGE UP (ref 0.1–0.3)
LACTATE BLDV-MCNC: 0.9 MMOL/L — SIGNIFICANT CHANGE UP (ref 0.5–2)
LACTATE BLDV-MCNC: 1 MMOL/L — SIGNIFICANT CHANGE UP (ref 0.5–2)
LACTATE SERPL-SCNC: 0.7 MMOL/L — SIGNIFICANT CHANGE UP (ref 0.7–2)
LYMPHOCYTES # BLD AUTO: 1.8 K/UL — SIGNIFICANT CHANGE UP (ref 1.2–3.4)
LYMPHOCYTES # BLD AUTO: 19.4 % — LOW (ref 20.5–51.1)
MCHC RBC-ENTMCNC: 29.9 PG — SIGNIFICANT CHANGE UP (ref 27–31)
MCHC RBC-ENTMCNC: 31.9 G/DL — LOW (ref 32–37)
MCV RBC AUTO: 93.6 FL — SIGNIFICANT CHANGE UP (ref 81–99)
MONOCYTES # BLD AUTO: 0.58 K/UL — SIGNIFICANT CHANGE UP (ref 0.1–0.6)
MONOCYTES NFR BLD AUTO: 6.3 % — SIGNIFICANT CHANGE UP (ref 1.7–9.3)
NEUTROPHILS # BLD AUTO: 6.32 K/UL — SIGNIFICANT CHANGE UP (ref 1.4–6.5)
NEUTROPHILS NFR BLD AUTO: 68.1 % — SIGNIFICANT CHANGE UP (ref 42.2–75.2)
NRBC # BLD: 0 /100 WBCS — SIGNIFICANT CHANGE UP (ref 0–0)
NT-PROBNP SERPL-SCNC: 124 PG/ML — SIGNIFICANT CHANGE UP (ref 0–300)
PCO2 BLDV: 62 MMHG — HIGH (ref 39–42)
PCO2 BLDV: 64 MMHG — HIGH (ref 39–42)
PH BLDV: 7.31 — LOW (ref 7.32–7.43)
PH BLDV: 7.31 — LOW (ref 7.32–7.43)
PLATELET # BLD AUTO: 353 K/UL — SIGNIFICANT CHANGE UP (ref 130–400)
PMV BLD: 9.1 FL — SIGNIFICANT CHANGE UP (ref 7.4–10.4)
PO2 BLDV: 31 MMHG — SIGNIFICANT CHANGE UP (ref 25–45)
PO2 BLDV: 36 MMHG — SIGNIFICANT CHANGE UP (ref 25–45)
POTASSIUM BLDV-SCNC: 4.1 MMOL/L — SIGNIFICANT CHANGE UP (ref 3.5–5.1)
POTASSIUM BLDV-SCNC: 4.8 MMOL/L — SIGNIFICANT CHANGE UP (ref 3.5–5.1)
POTASSIUM SERPL-MCNC: 4.3 MMOL/L — SIGNIFICANT CHANGE UP (ref 3.5–5)
POTASSIUM SERPL-SCNC: 4.3 MMOL/L — SIGNIFICANT CHANGE UP (ref 3.5–5)
PROT SERPL-MCNC: 7.9 G/DL — SIGNIFICANT CHANGE UP (ref 6–8)
RBC # BLD: 5.35 M/UL — SIGNIFICANT CHANGE UP (ref 4.2–5.4)
RBC # FLD: 12.2 % — SIGNIFICANT CHANGE UP (ref 11.5–14.5)
RSV RNA NPH QL NAA+NON-PROBE: SIGNIFICANT CHANGE UP
SAO2 % BLDV: 51.3 % — LOW (ref 67–88)
SAO2 % BLDV: 62 % — LOW (ref 67–88)
SARS-COV-2 RNA SPEC QL NAA+PROBE: SIGNIFICANT CHANGE UP
SODIUM SERPL-SCNC: 142 MMOL/L — SIGNIFICANT CHANGE UP (ref 135–146)
TROPONIN T, HIGH SENSITIVITY RESULT: 10 NG/L — SIGNIFICANT CHANGE UP (ref 6–13)
WBC # BLD: 9.28 K/UL — SIGNIFICANT CHANGE UP (ref 4.8–10.8)
WBC # FLD AUTO: 9.28 K/UL — SIGNIFICANT CHANGE UP (ref 4.8–10.8)

## 2024-09-01 PROCEDURE — 84132 ASSAY OF SERUM POTASSIUM: CPT

## 2024-09-01 PROCEDURE — 85014 HEMATOCRIT: CPT

## 2024-09-01 PROCEDURE — 94640 AIRWAY INHALATION TREATMENT: CPT

## 2024-09-01 PROCEDURE — 93010 ELECTROCARDIOGRAM REPORT: CPT

## 2024-09-01 PROCEDURE — 80053 COMPREHEN METABOLIC PANEL: CPT

## 2024-09-01 PROCEDURE — 83605 ASSAY OF LACTIC ACID: CPT

## 2024-09-01 PROCEDURE — 80048 BASIC METABOLIC PNL TOTAL CA: CPT

## 2024-09-01 PROCEDURE — 71045 X-RAY EXAM CHEST 1 VIEW: CPT | Mod: 26

## 2024-09-01 PROCEDURE — 82962 GLUCOSE BLOOD TEST: CPT

## 2024-09-01 PROCEDURE — 99291 CRITICAL CARE FIRST HOUR: CPT

## 2024-09-01 PROCEDURE — 85025 COMPLETE CBC W/AUTO DIFF WBC: CPT

## 2024-09-01 PROCEDURE — 82330 ASSAY OF CALCIUM: CPT

## 2024-09-01 PROCEDURE — 94660 CPAP INITIATION&MGMT: CPT

## 2024-09-01 PROCEDURE — 83735 ASSAY OF MAGNESIUM: CPT

## 2024-09-01 PROCEDURE — 84295 ASSAY OF SERUM SODIUM: CPT

## 2024-09-01 PROCEDURE — 36415 COLL VENOUS BLD VENIPUNCTURE: CPT

## 2024-09-01 PROCEDURE — 82803 BLOOD GASES ANY COMBINATION: CPT

## 2024-09-01 PROCEDURE — 85018 HEMOGLOBIN: CPT

## 2024-09-01 PROCEDURE — 99223 1ST HOSP IP/OBS HIGH 75: CPT

## 2024-09-01 PROCEDURE — 97162 PT EVAL MOD COMPLEX 30 MIN: CPT | Mod: GP

## 2024-09-01 RX ORDER — METHYLPREDNISOLONE 4 MG
40 TABLET ORAL
Refills: 0 | Status: DISCONTINUED | OUTPATIENT
Start: 2024-09-01 | End: 2024-09-03

## 2024-09-01 RX ORDER — DAPAGLIFLOZIN 10 MG/1
1 TABLET, FILM COATED ORAL
Refills: 0 | DISCHARGE

## 2024-09-01 RX ORDER — LISINOPRIL 10 MG/1
20 TABLET ORAL DAILY
Refills: 0 | Status: DISCONTINUED | OUTPATIENT
Start: 2024-09-01 | End: 2024-09-03

## 2024-09-01 RX ORDER — LISINOPRIL 10 MG/1
1 TABLET ORAL
Refills: 0 | DISCHARGE

## 2024-09-01 RX ORDER — DAPAGLIFLOZIN 10 MG/1
10 TABLET, FILM COATED ORAL DAILY
Refills: 0 | Status: DISCONTINUED | OUTPATIENT
Start: 2024-09-01 | End: 2024-09-03

## 2024-09-01 RX ORDER — IPRATROPIUM BROMIDE AND ALBUTEROL SULFATE .5; 3 MG/3ML; MG/3ML
3 SOLUTION RESPIRATORY (INHALATION)
Refills: 0 | Status: COMPLETED | OUTPATIENT
Start: 2024-09-01 | End: 2024-09-01

## 2024-09-01 RX ORDER — GLUCAGON INJECTION, SOLUTION 1 MG/.2ML
1 INJECTION, SOLUTION SUBCUTANEOUS ONCE
Refills: 0 | Status: DISCONTINUED | OUTPATIENT
Start: 2024-09-01 | End: 2024-09-03

## 2024-09-01 RX ORDER — INSULIN GLARGINE 100 [IU]/ML
9 INJECTION, SOLUTION SUBCUTANEOUS AT BEDTIME
Refills: 0 | Status: DISCONTINUED | OUTPATIENT
Start: 2024-09-01 | End: 2024-09-03

## 2024-09-01 RX ORDER — METHYLPREDNISOLONE 4 MG
125 TABLET ORAL ONCE
Refills: 0 | Status: COMPLETED | OUTPATIENT
Start: 2024-09-01 | End: 2024-09-01

## 2024-09-01 RX ORDER — DEXTROSE 15 G/33 G
15 GEL IN PACKET (GRAM) ORAL ONCE
Refills: 0 | Status: DISCONTINUED | OUTPATIENT
Start: 2024-09-01 | End: 2024-09-03

## 2024-09-01 RX ORDER — DEXTROSE 15 G/33 G
25 GEL IN PACKET (GRAM) ORAL ONCE
Refills: 0 | Status: DISCONTINUED | OUTPATIENT
Start: 2024-09-01 | End: 2024-09-03

## 2024-09-01 RX ORDER — FOLIC ACID/MULTIVIT,IRON,MINER 0.4MG-18MG
1 TABLET,CHEWABLE ORAL
Refills: 0 | DISCHARGE

## 2024-09-01 RX ORDER — AMLODIPINE BESYLATE 10 MG/1
2.5 TABLET ORAL DAILY
Refills: 0 | Status: DISCONTINUED | OUTPATIENT
Start: 2024-09-01 | End: 2024-09-03

## 2024-09-01 RX ORDER — AMLODIPINE BESYLATE 10 MG/1
1 TABLET ORAL
Refills: 0 | DISCHARGE

## 2024-09-01 RX ORDER — SEMAGLUTIDE 1 MG/.5ML
1 INJECTION, SOLUTION SUBCUTANEOUS
Refills: 0 | DISCHARGE

## 2024-09-01 RX ORDER — SENNA 187 MG
2 TABLET ORAL AT BEDTIME
Refills: 0 | Status: DISCONTINUED | OUTPATIENT
Start: 2024-09-01 | End: 2024-09-03

## 2024-09-01 RX ORDER — CARVEDILOL 6.25 MG/1
6.25 TABLET ORAL EVERY 12 HOURS
Refills: 0 | Status: DISCONTINUED | OUTPATIENT
Start: 2024-09-01 | End: 2024-09-03

## 2024-09-01 RX ORDER — IPRATROPIUM BROMIDE AND ALBUTEROL SULFATE .5; 3 MG/3ML; MG/3ML
3 SOLUTION RESPIRATORY (INHALATION) EVERY 6 HOURS
Refills: 0 | Status: DISCONTINUED | OUTPATIENT
Start: 2024-09-01 | End: 2024-09-03

## 2024-09-01 RX ORDER — BUDESONIDE AND FORMOTEROL FUMARATE 80; 4.5 UG/1; UG/1
2 AEROSOL, METERED RESPIRATORY (INHALATION)
Refills: 0 | Status: DISCONTINUED | OUTPATIENT
Start: 2024-09-01 | End: 2024-09-03

## 2024-09-01 RX ORDER — ENOXAPARIN SODIUM 100 MG/ML
40 INJECTION SUBCUTANEOUS EVERY 24 HOURS
Refills: 0 | Status: DISCONTINUED | OUTPATIENT
Start: 2024-09-01 | End: 2024-09-03

## 2024-09-01 RX ORDER — FOLIC ACID/MULTIVIT,IRON,MINER 0.4MG-18MG
2000 TABLET,CHEWABLE ORAL DAILY
Refills: 0 | Status: DISCONTINUED | OUTPATIENT
Start: 2024-09-01 | End: 2024-09-03

## 2024-09-01 RX ORDER — DEXTROSE 15 G/33 G
12.5 GEL IN PACKET (GRAM) ORAL ONCE
Refills: 0 | Status: DISCONTINUED | OUTPATIENT
Start: 2024-09-01 | End: 2024-09-03

## 2024-09-01 RX ADMIN — AMLODIPINE BESYLATE 2.5 MILLIGRAM(S): 10 TABLET ORAL at 12:34

## 2024-09-01 RX ADMIN — BUDESONIDE AND FORMOTEROL FUMARATE 2 PUFF(S): 80; 4.5 AEROSOL, METERED RESPIRATORY (INHALATION) at 21:31

## 2024-09-01 RX ADMIN — INSULIN GLARGINE 9 UNIT(S): 100 INJECTION, SOLUTION SUBCUTANEOUS at 22:04

## 2024-09-01 RX ADMIN — CARVEDILOL 6.25 MILLIGRAM(S): 6.25 TABLET ORAL at 17:28

## 2024-09-01 RX ADMIN — IPRATROPIUM BROMIDE AND ALBUTEROL SULFATE 3 MILLILITER(S): .5; 3 SOLUTION RESPIRATORY (INHALATION) at 19:11

## 2024-09-01 RX ADMIN — IPRATROPIUM BROMIDE AND ALBUTEROL SULFATE 3 MILLILITER(S): .5; 3 SOLUTION RESPIRATORY (INHALATION) at 10:50

## 2024-09-01 RX ADMIN — IPRATROPIUM BROMIDE AND ALBUTEROL SULFATE 3 MILLILITER(S): .5; 3 SOLUTION RESPIRATORY (INHALATION) at 01:45

## 2024-09-01 RX ADMIN — DAPAGLIFLOZIN 10 MILLIGRAM(S): 10 TABLET, FILM COATED ORAL at 12:32

## 2024-09-01 RX ADMIN — Medication 150 GRAM(S): at 02:08

## 2024-09-01 RX ADMIN — LISINOPRIL 20 MILLIGRAM(S): 10 TABLET ORAL at 12:35

## 2024-09-01 RX ADMIN — ENOXAPARIN SODIUM 40 MILLIGRAM(S): 100 INJECTION SUBCUTANEOUS at 17:28

## 2024-09-01 RX ADMIN — IPRATROPIUM BROMIDE AND ALBUTEROL SULFATE 3 MILLILITER(S): .5; 3 SOLUTION RESPIRATORY (INHALATION) at 01:50

## 2024-09-01 RX ADMIN — IPRATROPIUM BROMIDE AND ALBUTEROL SULFATE 3 MILLILITER(S): .5; 3 SOLUTION RESPIRATORY (INHALATION) at 01:47

## 2024-09-01 RX ADMIN — Medication 40 MILLIGRAM(S): at 17:28

## 2024-09-01 RX ADMIN — Medication 125 MILLIGRAM(S): at 02:05

## 2024-09-01 NOTE — PATIENT PROFILE ADULT - NS PRO AD NO ADVANCE DIRECTIVE
78 yo male hx of COPD on Home O2 (4-5L NC)/ HTN/HLD/DM, HFpEF, BIBA 2/2 SOB.  He was treated for mild acute COPD exacerbation and acute on chronic diastolic CHF exacerbation, and placed on high flow.  He was diuresed for a few days with improvement in his SOB and CXR findings, and he was weaned down to his home dose of O2 at 5L and pt is eager to go home.      His lasix will be increased from 20mg daily to 40mg PO daily.        #acute on chronic hypoxic/hypercapnic respiratory failure    #acute on chronic diastolic CHF exacerbation    #mild acute COPD exacerbation    #MICHAEL/OHS on NIPPV    #CKD3     #HTN/HLD     #IDDM2     #BPH    #suspected vitamin D deficiency         DNR/DNI (does not want hospice)    	    medically stable for discharge to home with 24h home care    f/u PMD within 1 week    f/u pulmonary in 1-2 weeks            T(F): 97 (08-10-19 @ 05:43), Max: 97.7 (08-09-19 @ 14:36)    HR: 109 (08-10-19 @ 05:43)    BP: 134/79 (08-10-19 @ 05:43) (129/62 - 134/79)    RR: 16 (08-10-19 @ 05:43)    SpO2: --        GENERAL: NAD    HEENT: MMM    CHEST/LUNG: diminshed    HEART: RRR, No murmurs    ABDOMEN: Soft, Bowel sounds present    EXTREMITIES:  no edema    NEURO: AOx3                                14.3     11.86 )-----------( 432      ( 09 Aug 2019 07:28 )               49.8         08-10        142  |  96<L>  |  60<H>    ----------------------------<  272<H>    5.1<H>   |  35<H>  |  1.9<H>        Ca    8.9      10 Aug 2019 04:30    Mg     2.5     08-09        TPro  5.8<L>  /  Alb  3.7  /  TBili  0.4  /  DBili  x   /  AST  17  /  ALT  25  /  AlkPhos  66  08-09                This is only a brief summary of the pt's hospital course.  Further details outlined in the EMR.    high risk of readmission/deterioration.          Total time:  45min
No

## 2024-09-01 NOTE — ED PROVIDER NOTE - PHYSICAL EXAMINATION
GENERAL: Acute respiratory distress  SKIN: warm, dry, no rashes  HEAD: Normocephalic; atraumatic.  EYES: 2mm pupils, PERRLA, EOMI, no conjunctival erythema  ENT: No nasal discharge; airway clear.  NECK: Supple; non tender.  CARD: Regular rate and rhythm. S1, S2 normal; no murmurs, gallops, or rubs.   RESP: Diffuse tight wheezing; No rales, rhonchi, or stridor.  ABD: soft, nontender, nondistended  EXT: Normal ROM.  No LE TTP or edema bilaterally.  NEURO: A/ox3, grossly unremarkable  PSYCH: Cooperative, appropriate.

## 2024-09-01 NOTE — H&P ADULT - ATTENDING COMMENTS
78 year old female with past medical history of HTN, DM, Asthma, renal cancer S/P left nephrectomy in remission, HFpEF presenting for 3 days history of gradually worsening shortness of breath with wheezing. SoB is present at rest and not associated with chest pain. Patient denies any recent URT symptoms, sick contact, fever, chills, LLE edema, or other GI or  symptoms.     #Acute Hypoxic Respiratory Failure  #Acute asthma exacerbation  CXR reviewed with no evidence of consolidation  sp BIPAP in ER, now on 3L NC saturating 97% with improvement in sxs  - c/w Solumedrol 40mg IV BID   - c/w DuoNeb Q6H and PRN  - Start Symbicort  - c/w NIV overnight and prn during day  - Ambulating pulse ox tomorrow on RA  - Refer to pulm on DC    #HTN/HLD  #Chronic HFpEF  TTE from last year here showed EF 61%, borderline pHTN, GIDD  - Cont lisinopril 20mg qD  - Cont coreg 6.25 BID  - Cont amlodipine 2.5mg qD    #DM2  - Insulin regimen    #Renal CA sp nephrectomy  - Outpatient f/u    DVT PPX, LVX    #Progress Note Handoff  Pending (specify): Cont IV steroids/nebs  Family discussion: lawanda pt regarding plan of care  Disposition: GMF, from home 78 year old female with past medical history of HTN, DM, Asthma, renal cancer S/P left nephrectomy in remission, HFpEF presenting for 3 days history of gradually worsening shortness of breath with wheezing. SoB is present at rest and not associated with chest pain. Patient denies any recent URT symptoms, sick contact, fever, chills, LLE edema, or other GI or  symptoms.     #Acute Hypoxic Respiratory Failure  #Acute asthma exacerbation  CXR reviewed with no evidence of consolidation  sp BIPAP in ER, now on 3L NC saturating 97% with improvement in sxs  - c/w Solumedrol 40mg IV BID   - c/w DuoNeb Q6H and PRN  - Start Symbicort  - c/w NIV overnight and prn during day  - Ambulating pulse ox tomorrow on RA  - Refer to pulm on DC    #HTN/HLD  #Chronic HFpEF  TTE from last year here showed EF 61%, borderline pHTN, GIDD  - Cont lisinopril 20mg qD  - Cont coreg 6.25 BID  - Cont amlodipine 2.5mg qD  - Cont Farxiga    #DM2  - Insulin regimen    #Renal CA sp nephrectomy  - Outpatient f/u    DVT PPX, LVX    #Progress Note Handoff  Pending (specify): Cont IV steroids/nebs  Family discussion: dw pt regarding plan of care  Disposition: GMF, from home

## 2024-09-01 NOTE — H&P ADULT - ASSESSMENT
78 year old female with past medical history of HTN, DM, Asthma, renal cancer S/P left nephrectomy in remission, HFpEF presenting for 3 days history of gradually worsening shortness of breath with wheezing. SoB is present at rest and not associated with chest pain. Patient denies any recent URT symptoms, sfever, chills,     #b/l LE edema  -, pt is obese   -CXR showed b/l interstitial/vascular prominence  -Outside TTE showed severe pHTN and normal EF  -TTE here showed EF 61%, borderline pHTN, GIDD  -LE duplex negative for DVT, showed b/l popliteal baker cysts   -s/p IV lasix, now on po Lasix 40mg daily  -d/c nifedipine as it can cause peripheral edema   -Check i's and o's and daily wt  -Low salt diet and water restriction to 1.5 L/D  -CHF unlikely given normal TTE. edema possibly due to CVI vs. b/l baker cysts vs. side effect of nifedipine     #New ascites   -renal ultrasound: status post left nephrectomy. Normal appearance of the right kidney. No hydronephrosis or renal stone. Moderate to large volume ascites.  -diagnostic paracentesis   -CT A/P with PO and IV contrast          78 year old female with past medical history of HTN, DM, Asthma, renal cancer S/P left nephrectomy in remission, HFpEF presenting for 3 days history of gradually worsening shortness of breath with wheezing. SoB is present at rest and not associated with chest pain. Patient denies any recent URT symptoms, sick contact, fever, chills, LLE edema, or other GI or  symptoms.     #Acute asthma exacerbation  - no recent allergen exposure, URT Sx, or sick contact  - on Ventolin only for mild persistent adult onset Asthma   - Ex-Smoker- quit 50 y ago   - VBG pCO2 62, pH 7.31, pO2 36  - CXR with no evidence of consolidation  - Used BiPAP for 6 H with reported symptoms  -TTE from last year here showed EF 61%, borderline pHTN, GIDD  - c/w Solumedrol 40mg IV BID   - c/w DuoNeb Q6H   - Add Symbicort  - c/w NIV overnight and prn during day               78 year old female with past medical history of HTN, DM, Asthma, renal cancer S/P left nephrectomy in remission, HFpEF presenting for 3 days history of gradually worsening shortness of breath with wheezing. SoB is present at rest and not associated with chest pain. Patient denies any recent URT symptoms, sick contact, fever, chills, LLE edema, or other GI or  symptoms.       #Acute Hypercapnic and hypoxic Respiratory Failure  #Acute asthma exacerbation  - no recent allergen exposure, URT Sx, or sick contact  - on Ventolin only for mild persistent adult onset Asthma   - Ex-Smoker- quit 50 y ago   - VBG pCO2 62, pH 7.31, pO2 36  - CXR with no evidence of consolidation  - s/p BIPAP in ER, now on 3L NC saturating 97% with improvement in sxs  - Pro-  - c/w Solumedrol 40mg IV BID   - c/w DuoNeb Q6H   - Add Symbicort  - c/w NIV overnight and prn during day  - Ambulating pulse ox tomorrow on RA  - Refer to pulm on DC    #HTN/HLD  #Chronic HFpEF  TTE from last year here showed EF 61%, borderline pHTN, GIDD  - Cont lisinopril 20mg qD  - Cont coreg 6.25 BID  - Cont amlodipine 2.5mg qD  - Cont Farxiga    #DM2  - Insulin regimen    #Renal CA sp nephrectomy  # CKD stage IIIA/B  - Trend BMP  - Outpatient f/u    DVT PPX: Lovenox    #Progress Note Handoff  Pending (specify): Cont IV steroids/nebs  Family discussion: lawanda pt regarding plan of care  Disposition: GMF, from home.

## 2024-09-01 NOTE — ED PROVIDER NOTE - CLINICAL SUMMARY MEDICAL DECISION MAKING FREE TEXT BOX
78-year-old female presents emergency department for shortness of breath concerning for asthma exacerbation.  When patient arrived she is tachypneic and hypoxic and was placed on BiPAP.  After BiPAP steroids, magnesium and albuterol patient much improved.  Patient admitted for further evaluation and management.

## 2024-09-01 NOTE — PATIENT PROFILE ADULT - FALL HARM RISK - HARM RISK INTERVENTIONS

## 2024-09-01 NOTE — ED PROVIDER NOTE - OBJECTIVE STATEMENT
78-year-old female, with past medical history of HTN, DM, asthma, left nephrectomy secondary to renal cancer, presents to the emergency department with progressively worsening shortness of breath over the past 3 days.  Denies fever, chills, cough, chest pain, leg pain or swelling, nausea, vomiting, diarrhea, abdominal pain, recent travel, known sick contacts.  Patient has been using her inhaler to completion.  Does not have a pulmonologist, her asthma is managed by her PMD.

## 2024-09-01 NOTE — ED PROVIDER NOTE - ATTENDING CONTRIBUTION TO CARE
78-year-old female with past medical history of hypertension, diabetes, asthma (previous hospitalizations but no intubations. not on home O2), renal CA sp L nephrectomy, CHF? presents to the ED for worsening SOB since Thursday.  No chest pain no fevers no nausea no vomiting.    Const: + tachypneic   Eyes: PERRL, no conjunctival injection  HENT:  Neck supple without meningismus   CV: RRR, Warm, well-perfused extremities  RESP: decreased breath sounds with scattered expiratory wheezes, + tachypnea   GI: soft, non-tender, non-distended  MSK: No gross deformities appreciated  Skin: Warm, dry. No rashes  Neuro: Alert, CNs II-XII grossly intact. Sensation and motor function of extremities grossly intact.  Psych: Appropriate mood and affect.    Patient placed on the monitor found to be hypoxic to 81%.  Patient tachypneic and unable to speak in full sentences.  Patient was immediately started on DuoNebs respiratory was called for BiPAP and magnesium Solu-Medrol were ordered as well as labs and EKG.

## 2024-09-01 NOTE — H&P ADULT - NSHPPHYSICALEXAM_GEN_ALL_CORE
no jaundice present
GENERAL: Acute respiratory distress  SKIN: warm, dry, no rashes  CARD: Regular rate and rhythm. S1, S2 normal; no murmurs, gallops, or rubs.   RESP: Diffuse bilateral wheezing; No rales, rhonchi, or stridor.  ABD: soft, nontender, nondistended  EXT: Normal ROM.  No LE edema bilaterally.  NEURO: A/ox3, grossly unremarkable  PSYCH: Cooperative, appropriate

## 2024-09-01 NOTE — H&P ADULT - HISTORY OF PRESENT ILLNESS
78 year old female with past medical history of HTN, DM, Adult onset Asthma, renal cancer S/P left nephrectomy in remission, HFpEF presenting for 3 days history of gradually worsening shortness of breath with wheezing. SoB is present at rest and not associated with chest pain. Patient denies any recent URT symptoms, sick contact, fever, chills, LLE edema, or other GI or  symptoms.     Ex-smoker: Quit 50 y ago  On presentation vitals:  · BP Systolic	 201 mm Hg  · BP Diastolic	79 mm Hg  · Heart Rate	84 /min  · Respiration Rate (breaths/min)	18 /min  · Temp (C)	36.8 Degrees C  · Temp site	oral  · SpO2 (%)	91 %  · O2 Delivery/Oxygen Delivery Method	room air  · Temp at ED Arrival (C)	36.8 Degrees C     78 year old female with past medical history of HTN, DM, Adult onset Asthma, renal cancer S/P left nephrectomy in remission, HFpEF presenting for 3 days history of gradually worsening shortness of breath with wheezing. SoB is present at rest and not associated with chest pain. Patient denies any recent URT symptoms, sick contact, fever, chills, LLE edema, or other GI or  symptoms.     Ex-smoker: Quit 50 y ago    On presentation vitals:  · BP Systolic	 201 mm Hg  · BP Diastolic	79 mm Hg  · Heart Rate	84 /min  · Respiration Rate (breaths/min)	18 /min  · Temp (C)	36.8 Degrees C  · Temp site	oral  · SpO2 (%)	91 %  · O2 Delivery/Oxygen Delivery Method	room air  · Temp at ED Arrival (C)	36.8 Degrees C    Labs significant for Cr=1.4, pCO2 62 on VBG, pH 7.31, pO2 36  CXR with no acute cardiopulmonary disease    Patient admitted for acute asthma exacerbation.

## 2024-09-01 NOTE — H&P ADULT - NSCORESITESY/N_GEN_A_CORE_RD
Called to give patient her lab results, and her  told me that she was on her way to the hospital with a possible fractured hip. Will mail results.  
Patient calling for lab results.   
Second attempt to report results of lipid panel and advise statin therapy   
No

## 2024-09-01 NOTE — ED ADULT NURSE NOTE - NSFALLRISKINTERV_ED_ALL_ED
Assistance with ambulation/Communicate fall risk and risk factors to all staff, patient, and family/Monitor gait and stability/Monitor for mental status changes and reorient to person, place, and time, as needed/Provide patient with walking aids/Provide visual cue: yellow wristband, yellow gown, etc/Reinforce activity limits and safety measures with patient and family/Call bell, personal items and telephone in reach/Instruct patient to call for assistance before getting out of bed/chair/stretcher/Non-slip footwear applied when patient is off stretcher/[place] to call system/Physically safe environment - no spills, clutter or unnecessary equipment/Purposeful Proactive Rounding/Room/bathroom lighting operational, light cord in reach

## 2024-09-02 LAB
ANION GAP SERPL CALC-SCNC: 12 MMOL/L — SIGNIFICANT CHANGE UP (ref 7–14)
BASOPHILS # BLD AUTO: 0.02 K/UL — SIGNIFICANT CHANGE UP (ref 0–0.2)
BASOPHILS NFR BLD AUTO: 0.2 % — SIGNIFICANT CHANGE UP (ref 0–1)
BUN SERPL-MCNC: 26 MG/DL — HIGH (ref 10–20)
CALCIUM SERPL-MCNC: 9.5 MG/DL — SIGNIFICANT CHANGE UP (ref 8.4–10.5)
CHLORIDE SERPL-SCNC: 102 MMOL/L — SIGNIFICANT CHANGE UP (ref 98–110)
CO2 SERPL-SCNC: 26 MMOL/L — SIGNIFICANT CHANGE UP (ref 17–32)
CREAT SERPL-MCNC: 1.3 MG/DL — SIGNIFICANT CHANGE UP (ref 0.7–1.5)
EGFR: 42 ML/MIN/1.73M2 — LOW
EOSINOPHIL # BLD AUTO: 0 K/UL — SIGNIFICANT CHANGE UP (ref 0–0.7)
EOSINOPHIL NFR BLD AUTO: 0 % — SIGNIFICANT CHANGE UP (ref 0–8)
GLUCOSE BLDC GLUCOMTR-MCNC: 148 MG/DL — HIGH (ref 70–99)
GLUCOSE BLDC GLUCOMTR-MCNC: 205 MG/DL — HIGH (ref 70–99)
GLUCOSE BLDC GLUCOMTR-MCNC: 207 MG/DL — HIGH (ref 70–99)
GLUCOSE BLDC GLUCOMTR-MCNC: 303 MG/DL — HIGH (ref 70–99)
GLUCOSE SERPL-MCNC: 160 MG/DL — HIGH (ref 70–99)
HCT VFR BLD CALC: 46.2 % — SIGNIFICANT CHANGE UP (ref 37–47)
HGB BLD-MCNC: 14.8 G/DL — SIGNIFICANT CHANGE UP (ref 12–16)
IMM GRANULOCYTES NFR BLD AUTO: 0.5 % — HIGH (ref 0.1–0.3)
LYMPHOCYTES # BLD AUTO: 1.22 K/UL — SIGNIFICANT CHANGE UP (ref 1.2–3.4)
LYMPHOCYTES # BLD AUTO: 10.5 % — LOW (ref 20.5–51.1)
MAGNESIUM SERPL-MCNC: 2.7 MG/DL — HIGH (ref 1.8–2.4)
MCHC RBC-ENTMCNC: 29.8 PG — SIGNIFICANT CHANGE UP (ref 27–31)
MCHC RBC-ENTMCNC: 32 G/DL — SIGNIFICANT CHANGE UP (ref 32–37)
MCV RBC AUTO: 93.1 FL — SIGNIFICANT CHANGE UP (ref 81–99)
MONOCYTES # BLD AUTO: 0.56 K/UL — SIGNIFICANT CHANGE UP (ref 0.1–0.6)
MONOCYTES NFR BLD AUTO: 4.8 % — SIGNIFICANT CHANGE UP (ref 1.7–9.3)
NEUTROPHILS # BLD AUTO: 9.81 K/UL — HIGH (ref 1.4–6.5)
NEUTROPHILS NFR BLD AUTO: 84 % — HIGH (ref 42.2–75.2)
NRBC # BLD: 0 /100 WBCS — SIGNIFICANT CHANGE UP (ref 0–0)
PLATELET # BLD AUTO: 334 K/UL — SIGNIFICANT CHANGE UP (ref 130–400)
PMV BLD: 9.5 FL — SIGNIFICANT CHANGE UP (ref 7.4–10.4)
POTASSIUM SERPL-MCNC: 4.6 MMOL/L — SIGNIFICANT CHANGE UP (ref 3.5–5)
POTASSIUM SERPL-SCNC: 4.6 MMOL/L — SIGNIFICANT CHANGE UP (ref 3.5–5)
RBC # BLD: 4.96 M/UL — SIGNIFICANT CHANGE UP (ref 4.2–5.4)
RBC # FLD: 12.4 % — SIGNIFICANT CHANGE UP (ref 11.5–14.5)
SODIUM SERPL-SCNC: 140 MMOL/L — SIGNIFICANT CHANGE UP (ref 135–146)
WBC # BLD: 11.67 K/UL — HIGH (ref 4.8–10.8)
WBC # FLD AUTO: 11.67 K/UL — HIGH (ref 4.8–10.8)

## 2024-09-02 PROCEDURE — 99232 SBSQ HOSP IP/OBS MODERATE 35: CPT

## 2024-09-02 RX ADMIN — Medication 2 TABLET(S): at 21:32

## 2024-09-02 RX ADMIN — Medication 2000 UNIT(S): at 11:13

## 2024-09-02 RX ADMIN — Medication 3 UNIT(S): at 17:37

## 2024-09-02 RX ADMIN — Medication 40 MILLIGRAM(S): at 17:31

## 2024-09-02 RX ADMIN — AMLODIPINE BESYLATE 2.5 MILLIGRAM(S): 10 TABLET ORAL at 06:07

## 2024-09-02 RX ADMIN — CARVEDILOL 6.25 MILLIGRAM(S): 6.25 TABLET ORAL at 17:31

## 2024-09-02 RX ADMIN — ENOXAPARIN SODIUM 40 MILLIGRAM(S): 100 INJECTION SUBCUTANEOUS at 17:31

## 2024-09-02 RX ADMIN — Medication 4: at 11:10

## 2024-09-02 RX ADMIN — Medication 3 UNIT(S): at 11:11

## 2024-09-02 RX ADMIN — INSULIN GLARGINE 9 UNIT(S): 100 INJECTION, SOLUTION SUBCUTANEOUS at 21:32

## 2024-09-02 RX ADMIN — IPRATROPIUM BROMIDE AND ALBUTEROL SULFATE 3 MILLILITER(S): .5; 3 SOLUTION RESPIRATORY (INHALATION) at 07:30

## 2024-09-02 RX ADMIN — BUDESONIDE AND FORMOTEROL FUMARATE 2 PUFF(S): 80; 4.5 AEROSOL, METERED RESPIRATORY (INHALATION) at 19:18

## 2024-09-02 RX ADMIN — CARVEDILOL 6.25 MILLIGRAM(S): 6.25 TABLET ORAL at 06:07

## 2024-09-02 RX ADMIN — IPRATROPIUM BROMIDE AND ALBUTEROL SULFATE 3 MILLILITER(S): .5; 3 SOLUTION RESPIRATORY (INHALATION) at 19:12

## 2024-09-02 RX ADMIN — DAPAGLIFLOZIN 10 MILLIGRAM(S): 10 TABLET, FILM COATED ORAL at 11:13

## 2024-09-02 RX ADMIN — Medication 40 MILLIGRAM(S): at 06:07

## 2024-09-02 RX ADMIN — LISINOPRIL 20 MILLIGRAM(S): 10 TABLET ORAL at 06:07

## 2024-09-02 RX ADMIN — Medication 2: at 17:36

## 2024-09-02 RX ADMIN — IPRATROPIUM BROMIDE AND ALBUTEROL SULFATE 3 MILLILITER(S): .5; 3 SOLUTION RESPIRATORY (INHALATION) at 13:34

## 2024-09-03 ENCOUNTER — APPOINTMENT (OUTPATIENT)
Dept: NEPHROLOGY | Facility: CLINIC | Age: 79
End: 2024-09-03

## 2024-09-03 ENCOUNTER — TRANSCRIPTION ENCOUNTER (OUTPATIENT)
Age: 79
End: 2024-09-03

## 2024-09-03 VITALS
RESPIRATION RATE: 18 BRPM | SYSTOLIC BLOOD PRESSURE: 163 MMHG | DIASTOLIC BLOOD PRESSURE: 92 MMHG | HEART RATE: 61 BPM | OXYGEN SATURATION: 95 %

## 2024-09-03 LAB
ALBUMIN SERPL ELPH-MCNC: 4 G/DL — SIGNIFICANT CHANGE UP (ref 3.5–5.2)
ALP SERPL-CCNC: 112 U/L — SIGNIFICANT CHANGE UP (ref 30–115)
ALT FLD-CCNC: 9 U/L — SIGNIFICANT CHANGE UP (ref 0–41)
ANION GAP SERPL CALC-SCNC: 15 MMOL/L — HIGH (ref 7–14)
ANION GAP SERPL CALC-SCNC: 8 MMOL/L — SIGNIFICANT CHANGE UP (ref 7–14)
AST SERPL-CCNC: 18 U/L — SIGNIFICANT CHANGE UP (ref 0–41)
BASOPHILS # BLD AUTO: 0 K/UL — SIGNIFICANT CHANGE UP (ref 0–0.2)
BASOPHILS NFR BLD AUTO: 0 % — SIGNIFICANT CHANGE UP (ref 0–1)
BILIRUB SERPL-MCNC: 0.5 MG/DL — SIGNIFICANT CHANGE UP (ref 0.2–1.2)
BUN SERPL-MCNC: 33 MG/DL — HIGH (ref 10–20)
BUN SERPL-MCNC: 33 MG/DL — HIGH (ref 10–20)
CALCIUM SERPL-MCNC: 9.6 MG/DL — SIGNIFICANT CHANGE UP (ref 8.4–10.5)
CALCIUM SERPL-MCNC: 9.9 MG/DL — SIGNIFICANT CHANGE UP (ref 8.4–10.5)
CHLORIDE SERPL-SCNC: 101 MMOL/L — SIGNIFICANT CHANGE UP (ref 98–110)
CHLORIDE SERPL-SCNC: 99 MMOL/L — SIGNIFICANT CHANGE UP (ref 98–110)
CO2 SERPL-SCNC: 20 MMOL/L — SIGNIFICANT CHANGE UP (ref 17–32)
CO2 SERPL-SCNC: 29 MMOL/L — SIGNIFICANT CHANGE UP (ref 17–32)
CREAT SERPL-MCNC: 1.2 MG/DL — SIGNIFICANT CHANGE UP (ref 0.7–1.5)
CREAT SERPL-MCNC: 1.3 MG/DL — SIGNIFICANT CHANGE UP (ref 0.7–1.5)
EGFR: 42 ML/MIN/1.73M2 — LOW
EGFR: 46 ML/MIN/1.73M2 — LOW
EOSINOPHIL # BLD AUTO: 0 K/UL — SIGNIFICANT CHANGE UP (ref 0–0.7)
EOSINOPHIL NFR BLD AUTO: 0 % — SIGNIFICANT CHANGE UP (ref 0–8)
GLUCOSE BLDC GLUCOMTR-MCNC: 124 MG/DL — HIGH (ref 70–99)
GLUCOSE BLDC GLUCOMTR-MCNC: 162 MG/DL — HIGH (ref 70–99)
GLUCOSE BLDC GLUCOMTR-MCNC: 199 MG/DL — HIGH (ref 70–99)
GLUCOSE BLDC GLUCOMTR-MCNC: 206 MG/DL — HIGH (ref 70–99)
GLUCOSE SERPL-MCNC: 140 MG/DL — HIGH (ref 70–99)
GLUCOSE SERPL-MCNC: 258 MG/DL — HIGH (ref 70–99)
HCT VFR BLD CALC: 48.5 % — HIGH (ref 37–47)
HGB BLD-MCNC: 15.4 G/DL — SIGNIFICANT CHANGE UP (ref 12–16)
IMM GRANULOCYTES NFR BLD AUTO: 0.3 % — SIGNIFICANT CHANGE UP (ref 0.1–0.3)
LYMPHOCYTES # BLD AUTO: 0.81 K/UL — LOW (ref 1.2–3.4)
LYMPHOCYTES # BLD AUTO: 7.5 % — LOW (ref 20.5–51.1)
MAGNESIUM SERPL-MCNC: 2.9 MG/DL — HIGH (ref 1.8–2.4)
MCHC RBC-ENTMCNC: 30 PG — SIGNIFICANT CHANGE UP (ref 27–31)
MCHC RBC-ENTMCNC: 31.8 G/DL — LOW (ref 32–37)
MCV RBC AUTO: 94.5 FL — SIGNIFICANT CHANGE UP (ref 81–99)
MONOCYTES # BLD AUTO: 0.26 K/UL — SIGNIFICANT CHANGE UP (ref 0.1–0.6)
MONOCYTES NFR BLD AUTO: 2.4 % — SIGNIFICANT CHANGE UP (ref 1.7–9.3)
NEUTROPHILS # BLD AUTO: 9.75 K/UL — HIGH (ref 1.4–6.5)
NEUTROPHILS NFR BLD AUTO: 89.8 % — HIGH (ref 42.2–75.2)
NRBC # BLD: 0 /100 WBCS — SIGNIFICANT CHANGE UP (ref 0–0)
PLATELET # BLD AUTO: 235 K/UL — SIGNIFICANT CHANGE UP (ref 130–400)
PMV BLD: 10.6 FL — HIGH (ref 7.4–10.4)
POTASSIUM SERPL-MCNC: 5 MMOL/L — SIGNIFICANT CHANGE UP (ref 3.5–5)
POTASSIUM SERPL-MCNC: 6.3 MMOL/L — CRITICAL HIGH (ref 3.5–5)
POTASSIUM SERPL-SCNC: 5 MMOL/L — SIGNIFICANT CHANGE UP (ref 3.5–5)
POTASSIUM SERPL-SCNC: 6.3 MMOL/L — CRITICAL HIGH (ref 3.5–5)
PROT SERPL-MCNC: 7 G/DL — SIGNIFICANT CHANGE UP (ref 6–8)
RBC # BLD: 5.13 M/UL — SIGNIFICANT CHANGE UP (ref 4.2–5.4)
RBC # FLD: 12.1 % — SIGNIFICANT CHANGE UP (ref 11.5–14.5)
SODIUM SERPL-SCNC: 136 MMOL/L — SIGNIFICANT CHANGE UP (ref 135–146)
SODIUM SERPL-SCNC: 136 MMOL/L — SIGNIFICANT CHANGE UP (ref 135–146)
WBC # BLD: 10.85 K/UL — HIGH (ref 4.8–10.8)
WBC # FLD AUTO: 10.85 K/UL — HIGH (ref 4.8–10.8)

## 2024-09-03 PROCEDURE — 99238 HOSP IP/OBS DSCHRG MGMT 30/<: CPT

## 2024-09-03 RX ORDER — BUDESONIDE AND FORMOTEROL FUMARATE 80; 4.5 UG/1; UG/1
2 AEROSOL, METERED RESPIRATORY (INHALATION)
Qty: 0 | Refills: 0 | DISCHARGE
Start: 2024-09-03

## 2024-09-03 RX ORDER — FOLIC ACID/MULTIVIT,IRON,MINER 0.4MG-18MG
0 TABLET,CHEWABLE ORAL
Refills: 0 | DISCHARGE

## 2024-09-03 RX ORDER — PREDNISONE 10 MG
2 TABLET, DOSE PACK ORAL
Qty: 0 | Refills: 0 | DISCHARGE
Start: 2024-09-03

## 2024-09-03 RX ORDER — PREDNISONE 10 MG
2 TABLET, DOSE PACK ORAL
Qty: 10 | Refills: 0
Start: 2024-09-03 | End: 2024-09-07

## 2024-09-03 RX ORDER — SENNA 187 MG
2 TABLET ORAL
Qty: 0 | Refills: 0 | DISCHARGE
Start: 2024-09-03

## 2024-09-03 RX ORDER — PREDNISONE 10 MG
40 TABLET, DOSE PACK ORAL DAILY
Refills: 0 | Status: DISCONTINUED | OUTPATIENT
Start: 2024-09-04 | End: 2024-09-03

## 2024-09-03 RX ORDER — BUDESONIDE AND FORMOTEROL FUMARATE 80; 4.5 UG/1; UG/1
2 AEROSOL, METERED RESPIRATORY (INHALATION)
Qty: 1 | Refills: 0
Start: 2024-09-03 | End: 2024-10-02

## 2024-09-03 RX ADMIN — ENOXAPARIN SODIUM 40 MILLIGRAM(S): 100 INJECTION SUBCUTANEOUS at 17:33

## 2024-09-03 RX ADMIN — INSULIN GLARGINE 9 UNIT(S): 100 INJECTION, SOLUTION SUBCUTANEOUS at 21:02

## 2024-09-03 RX ADMIN — LISINOPRIL 20 MILLIGRAM(S): 10 TABLET ORAL at 05:43

## 2024-09-03 RX ADMIN — IPRATROPIUM BROMIDE AND ALBUTEROL SULFATE 3 MILLILITER(S): .5; 3 SOLUTION RESPIRATORY (INHALATION) at 19:33

## 2024-09-03 RX ADMIN — Medication 2000 UNIT(S): at 11:28

## 2024-09-03 RX ADMIN — Medication 40 MILLIGRAM(S): at 05:43

## 2024-09-03 RX ADMIN — Medication 1: at 08:10

## 2024-09-03 RX ADMIN — CARVEDILOL 6.25 MILLIGRAM(S): 6.25 TABLET ORAL at 17:33

## 2024-09-03 RX ADMIN — Medication 3 UNIT(S): at 08:11

## 2024-09-03 RX ADMIN — AMLODIPINE BESYLATE 2.5 MILLIGRAM(S): 10 TABLET ORAL at 05:43

## 2024-09-03 RX ADMIN — IPRATROPIUM BROMIDE AND ALBUTEROL SULFATE 3 MILLILITER(S): .5; 3 SOLUTION RESPIRATORY (INHALATION) at 07:56

## 2024-09-03 RX ADMIN — DAPAGLIFLOZIN 10 MILLIGRAM(S): 10 TABLET, FILM COATED ORAL at 11:28

## 2024-09-03 RX ADMIN — CARVEDILOL 6.25 MILLIGRAM(S): 6.25 TABLET ORAL at 05:43

## 2024-09-03 RX ADMIN — Medication 3 UNIT(S): at 11:45

## 2024-09-03 RX ADMIN — Medication 3 UNIT(S): at 17:32

## 2024-09-03 RX ADMIN — Medication 2: at 11:45

## 2024-09-03 RX ADMIN — BUDESONIDE AND FORMOTEROL FUMARATE 2 PUFF(S): 80; 4.5 AEROSOL, METERED RESPIRATORY (INHALATION) at 07:49

## 2024-09-03 RX ADMIN — Medication 1: at 17:29

## 2024-09-03 NOTE — DISCHARGE NOTE PROVIDER - NSDCFUSCHEDAPPT_GEN_ALL_CORE_FT
Wendy Gibbons  [facility]  [address]  Scheduled Appointment: 10/21/2024

## 2024-09-03 NOTE — DISCHARGE NOTE PROVIDER - HOSPITAL COURSE
78 year old female with past medical history of HTN, DM, Asthma, renal cancer S/P left nephrectomy in remission, HFpEF presenting for 3 days history of gradually worsening shortness of breath with wheezing. SoB is present at rest and not associated with chest pain. Patient denies any recent URT symptoms, sick contact, fever, chills, LLE edema, or other GI or  symptoms.       #Acute Hypercapnic and hypoxic Respiratory Failure  #Acute asthma/ COPD  exacerbation  - no recent allergen exposure, URT Sx, or sick contact  - on Ventolin only for mild persistent adult onset Asthma   - Ex-Smoker- quit 50 y ago   - VBG pCO2 62, pH 7.31, pO2 36  - CXR with no evidence of consolidation  - s/p BIPAP in ER, now on 3L NC saturating 97% with improvement in sxs  - Pro-  - c/w Solumedrol 40mg IV BID   - c/w DuoNeb Q6H   - Add Symbicort  - c/w NIV overnight and prn during day  - weaned off O2 as tolerated.   - (possible undx hx of COPD, lungs appear inflated on xray and pCO2 64)    #HTN/HLD  #Chronic HFpEF  TTE from last year here showed EF 61%, borderline pHTN, GIDD  - Cont lisinopril 20mg qD  - Cont coreg 6.25 BID  - Cont amlodipine 2.5mg qD  - Cont Farxiga    #DM2  - Insulin regimen    #Renal CA sp nephrectomy  # CKD stage IIIA/B  - Trend BMP  - Outpatient f/u    DVT PPX: Lovenox    DC planning today.

## 2024-09-03 NOTE — DISCHARGE NOTE PROVIDER - NSDCMRMEDTOKEN_GEN_ALL_CORE_FT
Albuterol (Eqv-ProAir HFA) 90 mcg/inh inhalation aerosol: 2 puff(s) inhaled every 6 hours as needed for  bronchospasm  amLODIPine 2.5 mg oral tablet: 1 tab(s) orally once a day  budesonide-formoterol 80 mcg-4.5 mcg/inh inhalation aerosol: 2 puff(s) inhaled once a day  carvedilol 6.25 mg oral tablet: 1 tab(s) orally 2 times a day  cholecalciferol:   Farxiga 10 mg oral tablet: 1 tab(s) orally once a day  lisinopril 20 mg oral tablet: 1 tab(s) orally once a day  Ozempic 2 mg/1.5 mL (1 mg dose) subcutaneous solution: 1 milligram(s) subcutaneous once a week  predniSONE 20 mg oral tablet: 2 tab(s) orally once a day  senna leaf extract oral tablet: 2 tab(s) orally once a day (at bedtime)  Vitamin D3 1250 mcg (50,000 intl units) oral capsule: 1 cap(s) orally once a week   Albuterol (Eqv-ProAir HFA) 90 mcg/inh inhalation aerosol: 2 puff(s) inhaled every 6 hours as needed for  bronchospasm  amLODIPine 2.5 mg oral tablet: 1 tab(s) orally once a day  budesonide-formoterol 80 mcg-4.5 mcg/inh inhalation aerosol: 2 puff(s) inhaled once a day  carvedilol 6.25 mg oral tablet: 1 tab(s) orally 2 times a day  Farxiga 10 mg oral tablet: 1 tab(s) orally once a day  lisinopril 20 mg oral tablet: 1 tab(s) orally once a day  Ozempic 2 mg/1.5 mL (1 mg dose) subcutaneous solution: 1 milligram(s) subcutaneous once a week  predniSONE 20 mg oral tablet: 2 tab(s) orally once a day MDD: 40mg  senna leaf extract oral tablet: 2 tab(s) orally once a day (at bedtime)  Vitamin D3 1250 mcg (50,000 intl units) oral capsule: 1 cap(s) orally once a week

## 2024-09-03 NOTE — DISCHARGE NOTE PROVIDER - NSDCCPCAREPLAN_GEN_ALL_CORE_FT
PRINCIPAL DISCHARGE DIAGNOSIS  Diagnosis: Acute asthma exacerbation  Assessment and Plan of Treatment:      PRINCIPAL DISCHARGE DIAGNOSIS  Diagnosis: Acute asthma exacerbation  Assessment and Plan of Treatment: You were admitted to the hospital worsening shortness of breath and wheezing. You received medications that helped dilate your airways and help you breathe, and were prescribed some upon discharge - please  at your [facility]. Please follow up with your primary doctor, Dr. Gil, and follow up as an outpatient with a pulmonologist.

## 2024-09-03 NOTE — DISCHARGE NOTE NURSING/CASE MANAGEMENT/SOCIAL WORK - PATIENT PORTAL LINK FT
You can access the FollowMyHealth Patient Portal offered by [facility] by registering at the following website: [email or web address]. By joining [employer]’s FollowMyHealth portal, you will also be able to view your health information using other applications (apps) compatible with our system.

## 2024-09-03 NOTE — PROGRESS NOTE ADULT - SUBJECTIVE AND OBJECTIVE BOX
24H events:    Patient is a 78y old Female who presents with a chief complaint of SoB progressively worsening over past 3 days (01 Sep 2024 14:54)    Primary diagnosis of Acute asthma exacerbation        Today is 1d of hospitalization. This morning patient was seen and examined at bedside, resting comfortably in bed.    No acute or major events overnight.      PAST MEDICAL & SURGICAL HISTORY  Hypertension    Type 2 diabetes mellitus    Asthmatic bronchitis , chronic    Primary cancer of kidney    History of carpal tunnel repair    H/O left nephrectomy      SOCIAL HISTORY:  Social History:      ALLERGIES:  peanuts (Anaphylaxis)  No Known Drug Allergies    MEDICATIONS:  STANDING MEDICATIONS  albuterol/ipratropium for Nebulization 3 milliLiter(s) Nebulizer every 6 hours  amLODIPine   Tablet 2.5 milliGRAM(s) Oral daily  budesonide  80 MICROgram(s)/formoterol 4.5 MICROgram(s) Inhaler 2 Puff(s) Inhalation two times a day  carvedilol 6.25 milliGRAM(s) Oral every 12 hours  cholecalciferol 2000 Unit(s) Oral daily  dapagliflozin 10 milliGRAM(s) Oral daily  dextrose 5%. 1000 milliLiter(s) IV Continuous <Continuous>  dextrose 5%. 1000 milliLiter(s) IV Continuous <Continuous>  dextrose 50% Injectable 25 Gram(s) IV Push once  dextrose 50% Injectable 25 Gram(s) IV Push once  dextrose 50% Injectable 12.5 Gram(s) IV Push once  enoxaparin Injectable 40 milliGRAM(s) SubCutaneous every 24 hours  glucagon  Injectable 1 milliGRAM(s) IntraMuscular once  insulin glargine Injectable (LANTUS) 9 Unit(s) SubCutaneous at bedtime  insulin lispro (ADMELOG) corrective regimen sliding scale   SubCutaneous three times a day before meals  insulin lispro Injectable (ADMELOG) 3 Unit(s) SubCutaneous three times a day before meals  lisinopril 20 milliGRAM(s) Oral daily  methylPREDNISolone sodium succinate Injectable 40 milliGRAM(s) IV Push two times a day  senna 2 Tablet(s) Oral at bedtime    PRN MEDICATIONS  albuterol    90 MICROgram(s) HFA Inhaler 2 Puff(s) Inhalation every 3 hours PRN  dextrose Oral Gel 15 Gram(s) Oral once PRN    VITALS:   T(F): 97.8  HR: 71  BP: 124/80  RR: 18  SpO2: 100%    PHYSICAL EXAM:  GENERAL:   ( x) NAD, lying in bed comfortably     (  ) obtunded     (  ) lethargic     (  ) somnolent      NECK:  (x) Supple     (  ) neck stiffness     (  ) nuchal rigidity     (  )  no JVD     (  ) JVD present ( -- cm)    HEART:  Rate -->     (x) normal rate     (  ) bradycardic     (  ) tachycardic  Rhythm -->     (x) regular     (  ) regularly irregular     (  ) irregularly irregular  Murmurs -->     (x) normal s1s2     (  ) systolic murmur     (  ) diastolic murmur     (  ) continuous murmur      (  ) S3 present     (  ) S4 present    LUNGS:   ( x)Unlabored respirations     (  ) tachypnea  ( x) B/L air entry     (  ) decreased breath sounds in:  (location     )    ( x) no adventitious sound     (  ) crackles     (  ) wheezing      (  ) rhonchi      (specify location:       )  (  ) chest wall tenderness (specify location:       )    ABDOMEN:   ( x) Soft     (  ) tense   |   (  ) nondistended     (  ) distended   |   (  ) +BS     (  ) hypoactive bowel sounds     (  ) hyperactive bowel sounds  ( x) nontender     (  ) RUQ tenderness     (  ) RLQ tenderness     (  ) LLQ tenderness     (  ) epigastric tenderness     (  ) diffuse tenderness  (  ) Splenomegaly      (  ) Hepatomegaly      (  ) Jaundice     (  ) ecchymosis     EXTREMITIES:  ( x) Normal     (  ) Rash     (  ) ecchymosis     (  ) varicose veins      (  ) pitting edema     (  ) non-pitting edema   (  ) ulceration     (  ) gangrene:     (location:     )    NERVOUS SYSTEM:    ( x) A&Ox3     (  ) confused     (  ) lethargic  CN II-XII:     ( x) Intact     (  ) deficits found     (Specify:     )   Upper extremities:     (  ) no sensorimotor deficits     (  ) weakness     (  ) loss of proprioception/vibration     (  ) loss of touch/temperature (specify:    )  Lower extremities:     (  ) no sensorimotor deficits     (  ) weakness     (  ) loss of proprioception/vibration     (  ) loss of touch/temperature (specify:    )    SKIN:   (  ) No rashes or lesions     (  ) maculopapular rash     (  ) pustules     (  ) vesicles     (  ) ulcer     (  ) ecchymosis     (specify location:     )      LABS:                        14.8   11.67 )-----------( 334      ( 02 Sep 2024 07:37 )             46.2     09-02    140  |  102  |  26<H>  ----------------------------<  160<H>  4.6   |  26  |  1.3    Ca    9.5      02 Sep 2024 07:37  Mg     2.7     09-02    TPro  7.9  /  Alb  4.3  /  TBili  0.6  /  DBili  x   /  AST  17  /  ALT  9   /  AlkPhos  138<H>  09-01      Urinalysis Basic - ( 02 Sep 2024 07:37 )    Color: x / Appearance: x / SG: x / pH: x  Gluc: 160 mg/dL / Ketone: x  / Bili: x / Urobili: x   Blood: x / Protein: x / Nitrite: x   Leuk Esterase: x / RBC: x / WBC x   Sq Epi: x / Non Sq Epi: x / Bacteria: x                    ASSESSMENT AND PLAN  78 year old female with past medical history of HTN, DM, Asthma, renal cancer S/P left nephrectomy in remission, HFpEF presenting for 3 days history of gradually worsening shortness of breath with wheezing. SoB is present at rest and not associated with chest pain. Patient denies any recent URT symptoms, sick contact, fever, chills, LLE edema, or other GI or  symptoms.       #Acute Hypercapnic and hypoxic Respiratory Failure  #Acute asthma exacerbation  - no recent allergen exposure, URT Sx, or sick contact  - on Ventolin only for mild persistent adult onset Asthma   - Ex-Smoker- quit 50 y ago   - VBG pCO2 62, pH 7.31, pO2 36  - CXR with no evidence of consolidation  - s/p BIPAP in ER, now on 3L NC saturating 97% with improvement in sxs  - Pro-  - c/w Solumedrol 40mg IV BID   - c/w DuoNeb Q6H   - Add Symbicort  - c/w NIV overnight and prn during day  - Ambulating pulse ox tomorrow on RA  - Refer to pulm on DC  - Wean off O2 - 92% (possible undx hx of COPD, lungs appear inflated on xray and pCO2 64)    #HTN/HLD  #Chronic HFpEF  TTE from last year here showed EF 61%, borderline pHTN, GIDD  - Cont lisinopril 20mg qD  - Cont coreg 6.25 BID  - Cont amlodipine 2.5mg qD  - Cont Farxiga    #DM2  - Insulin regimen    #Renal CA sp nephrectomy  # CKD stage IIIA/B  - Trend BMP  - Outpatient f/u    DVT PPX: Lovenox    #Progress Note Handoff  Pending (specify): Cont IV steroids/nebs  Family discussion: dw pt regarding plan of care  Disposition: GMF, from home.      
24H events:    Patient is a 78y old Female who presents with a chief complaint of SoB progressively worsening over past 3 days (02 Sep 2024 16:31)    Primary diagnosis of Acute asthma exacerbation    No acute or major events overnight.    Today is 2d of hospitalization. This morning patient was seen and examined at bedside, resting comfortably in bed. In a happy mood, states she feels well, endorses no pain, no SOB. Would like to see PT.         PAST MEDICAL & SURGICAL HISTORY  Hypertension    Type 2 diabetes mellitus    Asthmatic bronchitis , chronic    Primary cancer of kidney    History of carpal tunnel repair    H/O left nephrectomy      SOCIAL HISTORY:  Social History:      ALLERGIES:  peanuts (Anaphylaxis)  No Known Drug Allergies    MEDICATIONS:  STANDING MEDICATIONS  albuterol/ipratropium for Nebulization 3 milliLiter(s) Nebulizer every 6 hours  amLODIPine   Tablet 2.5 milliGRAM(s) Oral daily  budesonide  80 MICROgram(s)/formoterol 4.5 MICROgram(s) Inhaler 2 Puff(s) Inhalation two times a day  carvedilol 6.25 milliGRAM(s) Oral every 12 hours  cholecalciferol 2000 Unit(s) Oral daily  dapagliflozin 10 milliGRAM(s) Oral daily  dextrose 5%. 1000 milliLiter(s) IV Continuous <Continuous>  dextrose 5%. 1000 milliLiter(s) IV Continuous <Continuous>  dextrose 50% Injectable 25 Gram(s) IV Push once  dextrose 50% Injectable 25 Gram(s) IV Push once  dextrose 50% Injectable 12.5 Gram(s) IV Push once  enoxaparin Injectable 40 milliGRAM(s) SubCutaneous every 24 hours  glucagon  Injectable 1 milliGRAM(s) IntraMuscular once  insulin glargine Injectable (LANTUS) 9 Unit(s) SubCutaneous at bedtime  insulin lispro (ADMELOG) corrective regimen sliding scale   SubCutaneous three times a day before meals  insulin lispro Injectable (ADMELOG) 3 Unit(s) SubCutaneous three times a day before meals  lisinopril 20 milliGRAM(s) Oral daily  methylPREDNISolone sodium succinate Injectable 40 milliGRAM(s) IV Push two times a day  senna 2 Tablet(s) Oral at bedtime    PRN MEDICATIONS  albuterol    90 MICROgram(s) HFA Inhaler 2 Puff(s) Inhalation every 3 hours PRN  dextrose Oral Gel 15 Gram(s) Oral once PRN    VITALS:   T(F): 97.7  HR: 55  BP: 147/78  RR: 18  SpO2: 96%    PHYSICAL EXAM:  GENERAL:   ( x) NAD, lying in bed comfortably     (  ) obtunded     (  ) lethargic     (  ) somnolent      NECK:  (x) Supple     (  ) neck stiffness     (  ) nuchal rigidity     (  )  no JVD     (  ) JVD present ( -- cm)    HEART:  Rate -->     (x) normal rate     (  ) bradycardic     (  ) tachycardic  Rhythm -->     (x) regular     (  ) regularly irregular     (  ) irregularly irregular  Murmurs -->     (x) normal s1s2     (  ) systolic murmur     (  ) diastolic murmur     (  ) continuous murmur      (  ) S3 present     (  ) S4 present    LUNGS:   ( x)Unlabored respirations     (  ) tachypnea  ( x) B/L air entry     (  ) decreased breath sounds in:  (location     )    ( x) no adventitious sound     (  ) crackles     (  ) wheezing      (  ) rhonchi      (specify location:       )  (  ) chest wall tenderness (specify location:       )    ABDOMEN:   ( x) Soft     (  ) tense   |   (  ) nondistended     (  ) distended   |   (  ) +BS     (  ) hypoactive bowel sounds     (  ) hyperactive bowel sounds  ( x) nontender     (  ) RUQ tenderness     (  ) RLQ tenderness     (  ) LLQ tenderness     (  ) epigastric tenderness     (  ) diffuse tenderness  (  ) Splenomegaly      (  ) Hepatomegaly      (  ) Jaundice     (  ) ecchymosis     EXTREMITIES:  ( x) Normal     (  ) Rash     (  ) ecchymosis     (  ) varicose veins      (  ) pitting edema     (  ) non-pitting edema   (  ) ulceration     (  ) gangrene:     (location:     )    NERVOUS SYSTEM:    ( x) A&Ox3     (  ) confused     (  ) lethargic  CN II-XII:     ( x) Intact     (  ) deficits found     (Specify:     )   Upper extremities:     (  ) no sensorimotor deficits     (  ) weakness     (  ) loss of proprioception/vibration     (  ) loss of touch/temperature (specify:    )  Lower extremities:     (  ) no sensorimotor deficits     (  ) weakness     (  ) loss of proprioception/vibration     (  ) loss of touch/temperature (specify:    )      LABS:                        14.8   11.67 )-----------( 334      ( 02 Sep 2024 07:37 )             46.2     09-02    140  |  102  |  26<H>  ----------------------------<  160<H>  4.6   |  26  |  1.3    Ca    9.5      02 Sep 2024 07:37  Mg     2.7     09-02        Urinalysis Basic - ( 02 Sep 2024 07:37 )    Color: x / Appearance: x / SG: x / pH: x  Gluc: 160 mg/dL / Ketone: x  / Bili: x / Urobili: x   Blood: x / Protein: x / Nitrite: x   Leuk Esterase: x / RBC: x / WBC x   Sq Epi: x / Non Sq Epi: x / Bacteria: x        ASSESSMENT AND PLAN  78 year old female with past medical history of HTN, DM, Asthma, renal cancer S/P left nephrectomy in remission, HFpEF presenting for 3 days history of gradually worsening shortness of breath with wheezing. SoB is present at rest and not associated with chest pain. Patient denies any recent URT symptoms, sick contact, fever, chills, LLE edema, or other GI or  symptoms.       #Acute Hypercapnic and hypoxic Respiratory Failure  #Acute asthma exacerbation  - no recent allergen exposure, URT Sx, or sick contact  - on Ventolin only for mild persistent adult onset Asthma   - Ex-Smoker- quit 50 y ago   - VBG pCO2 62, pH 7.31, pO2 36  - CXR with no evidence of consolidation  - s/p BIPAP in ER, now on 3L NC saturating 97% with improvement in sxs  - Pro-  - c/w Solumedrol 40mg IV BID   - c/w DuoNeb Q6H   - Add Symbicort  - c/w NIV overnight and prn during day  - Ambulating pulse ox tomorrow on RA  - Refer to pulm on DC  - Wean off O2 - 92% (possible undx hx of COPD, lungs appear inflated on xray and pCO2 64)    #HTN/HLD  #Chronic HFpEF  TTE from last year here showed EF 61%, borderline pHTN, GIDD  - Cont lisinopril 20mg qD  - Cont coreg 6.25 BID  - Cont amlodipine 2.5mg qD  - Cont Farxiga    #DM2  - Insulin regimen    #Renal CA sp nephrectomy  # CKD stage IIIA/B  - Trend BMP  - Outpatient f/u    DVT PPX: Lovenox    #Progress Note Handoff  Pending (specify): Cont IV steroids/nebs  Family discussion: dw pt regarding plan of care  Disposition: GMF, from home.  
  RENITA DEWEY  78y  Female      Patient is a 78y old  Female who presents with a chief complaint of SoB progressively worsening over past 3 days.      INTERVAL HPI/OVERNIGHT EVENTS:      ******************************* REVIEW OF SYSTEMS:**********************************************  feeling and breathing better,   All other review of systems negative    *********************** VITALS ******************************************    T(F): 97.8 (09-02-24 @ 13:00)  HR: 87 (09-02-24 @ 13:00) (65 - 88)  BP: 142/81 (09-02-24 @ 13:00) (124/80 - 172/90)  RR: 18 (09-02-24 @ 13:00) (18 - 18)  SpO2: 98% (09-02-24 @ 13:00) (97% - 100%)    09-02-24 @ 07:01  -  09-02-24 @ 16:31  --------------------------------------------------------  IN: 0 mL / OUT: 1000 mL / NET: -1000 mL            09-02-24 @ 07:01  -  09-02-24 @ 16:31  --------------------------------------------------------  IN: 0 mL / OUT: 1000 mL / NET: -1000 mL        ******************************** PHYSICAL EXAM:**************************************************  GENERAL: NAD    PSYCH: no agitation, baseline mentation  HEENT:     NERVOUS SYSTEM:  Alert & Oriented X3, MS  5/5 B/L  UE and LE ; Sensory intact    PULMONARY: MILAN, faint wheezing B/L     CARDIOVASCULAR: S1S2 RRR    GI: Soft, NT, ND; BS present.    EXTREMITIES:  2+ Peripheral Pulses, No clubbing, cyanosis, or edema    LYMPH: No lymphadenopathy noted    SKIN: No rashes or lesions      **************************** LABS *******************************************************                          14.8   11.67 )-----------( 334      ( 02 Sep 2024 07:37 )             46.2     09-02    140  |  102  |  26<H>  ----------------------------<  160<H>  4.6   |  26  |  1.3    Ca    9.5      02 Sep 2024 07:37  Mg     2.7     09-02    TPro  7.9  /  Alb  4.3  /  TBili  0.6  /  DBili  x   /  AST  17  /  ALT  9   /  AlkPhos  138<H>  09-01      Urinalysis Basic - ( 02 Sep 2024 07:37 )    Color: x / Appearance: x / SG: x / pH: x  Gluc: 160 mg/dL / Ketone: x  / Bili: x / Urobili: x   Blood: x / Protein: x / Nitrite: x   Leuk Esterase: x / RBC: x / WBC x   Sq Epi: x / Non Sq Epi: x / Bacteria: x        Lactate Trend  09-01 @ 02:10 Lactate:0.7         CAPILLARY BLOOD GLUCOSE      POCT Blood Glucose.: 303 mg/dL (02 Sep 2024 10:55)          **************************Active Medications *******************************************  peanuts (Anaphylaxis)  No Known Drug Allergies      albuterol    90 MICROgram(s) HFA Inhaler 2 Puff(s) Inhalation every 3 hours PRN  albuterol/ipratropium for Nebulization 3 milliLiter(s) Nebulizer every 6 hours  amLODIPine   Tablet 2.5 milliGRAM(s) Oral daily  budesonide  80 MICROgram(s)/formoterol 4.5 MICROgram(s) Inhaler 2 Puff(s) Inhalation two times a day  carvedilol 6.25 milliGRAM(s) Oral every 12 hours  cholecalciferol 2000 Unit(s) Oral daily  dapagliflozin 10 milliGRAM(s) Oral daily  dextrose 5%. 1000 milliLiter(s) IV Continuous <Continuous>  dextrose 5%. 1000 milliLiter(s) IV Continuous <Continuous>  dextrose 50% Injectable 12.5 Gram(s) IV Push once  dextrose 50% Injectable 25 Gram(s) IV Push once  dextrose 50% Injectable 25 Gram(s) IV Push once  dextrose Oral Gel 15 Gram(s) Oral once PRN  enoxaparin Injectable 40 milliGRAM(s) SubCutaneous every 24 hours  glucagon  Injectable 1 milliGRAM(s) IntraMuscular once  insulin glargine Injectable (LANTUS) 9 Unit(s) SubCutaneous at bedtime  insulin lispro (ADMELOG) corrective regimen sliding scale   SubCutaneous three times a day before meals  insulin lispro Injectable (ADMELOG) 3 Unit(s) SubCutaneous three times a day before meals  lisinopril 20 milliGRAM(s) Oral daily  methylPREDNISolone sodium succinate Injectable 40 milliGRAM(s) IV Push two times a day  senna 2 Tablet(s) Oral at bedtime      ***************************************************  RADIOLOGY & ADDITIONAL TESTS:    Imaging Personally Reviewed:  [ ] YES  [ ] NO    HEALTH ISSUES - PROBLEM Dx:      
  RENITA DEWEY  78y  Female      Patient is a 78y old  Female who presents with a chief complaint of SoB progressively worsening over past 3 days.    INTERVAL HPI/OVERNIGHT EVENTS:      ******************************* REVIEW OF SYSTEMS:**********************************************    Keeps feeling and breathing better.  All other review of systems negative    *********************** VITALS ******************************************    T(F): 97.7 (09-03-24 @ 05:43)  HR: 55 (09-03-24 @ 07:45) (51 - 55)  BP: 147/78 (09-03-24 @ 05:43) (147/78 - 157/76)  RR: 18 (09-03-24 @ 07:45) (18 - 19)  SpO2: 96% (09-03-24 @ 07:45) (96% - 100%)    09-02-24 @ 07:01  -  09-03-24 @ 07:00  --------------------------------------------------------  IN: 0 mL / OUT: 1000 mL / NET: -1000 mL            09-02-24 @ 07:01  -  09-03-24 @ 07:00  --------------------------------------------------------  IN: 0 mL / OUT: 1000 mL / NET: -1000 mL        ******************************** PHYSICAL EXAM:**************************************************  GENERAL: NAD    PSYCH: no agitation, baseline mentation  HEENT:     NERVOUS SYSTEM:  Alert & Oriented X3,     PULMONARY: MILAN, CTA    CARDIOVASCULAR: S1S2 RRR    GI: Soft, NT, ND; BS present.    EXTREMITIES:  2+ Peripheral Pulses, No clubbing, cyanosis, or edema    LYMPH: No lymphadenopathy noted    SKIN: No rashes or lesions      **************************** LABS *******************************************************                          15.4   10.85 )-----------( 235      ( 03 Sep 2024 09:28 )             48.5     09-03    136  |  99  |  33<H>  ----------------------------<  258<H>  5.0   |  29  |  1.2    Ca    9.9      03 Sep 2024 14:40  Mg     2.9     09-03    TPro  7.0  /  Alb  4.0  /  TBili  0.5  /  DBili  x   /  AST  18  /  ALT  9   /  AlkPhos  112  09-03      Urinalysis Basic - ( 03 Sep 2024 14:40 )    Color: x / Appearance: x / SG: x / pH: x  Gluc: 258 mg/dL / Ketone: x  / Bili: x / Urobili: x   Blood: x / Protein: x / Nitrite: x   Leuk Esterase: x / RBC: x / WBC x   Sq Epi: x / Non Sq Epi: x / Bacteria: x        Lactate Trend  09-01 @ 02:10 Lactate:0.7         CAPILLARY BLOOD GLUCOSE      POCT Blood Glucose.: 206 mg/dL (03 Sep 2024 10:49)          **************************Active Medications *******************************************  peanuts (Anaphylaxis)  No Known Drug Allergies      albuterol    90 MICROgram(s) HFA Inhaler 2 Puff(s) Inhalation every 3 hours PRN  albuterol/ipratropium for Nebulization 3 milliLiter(s) Nebulizer every 6 hours  amLODIPine   Tablet 2.5 milliGRAM(s) Oral daily  budesonide  80 MICROgram(s)/formoterol 4.5 MICROgram(s) Inhaler 2 Puff(s) Inhalation two times a day  carvedilol 6.25 milliGRAM(s) Oral every 12 hours  cholecalciferol 2000 Unit(s) Oral daily  dapagliflozin 10 milliGRAM(s) Oral daily  dextrose 5%. 1000 milliLiter(s) IV Continuous <Continuous>  dextrose 5%. 1000 milliLiter(s) IV Continuous <Continuous>  dextrose 50% Injectable 12.5 Gram(s) IV Push once  dextrose 50% Injectable 25 Gram(s) IV Push once  dextrose 50% Injectable 25 Gram(s) IV Push once  dextrose Oral Gel 15 Gram(s) Oral once PRN  enoxaparin Injectable 40 milliGRAM(s) SubCutaneous every 24 hours  glucagon  Injectable 1 milliGRAM(s) IntraMuscular once  insulin glargine Injectable (LANTUS) 9 Unit(s) SubCutaneous at bedtime  insulin lispro (ADMELOG) corrective regimen sliding scale   SubCutaneous three times a day before meals  insulin lispro Injectable (ADMELOG) 3 Unit(s) SubCutaneous three times a day before meals  lisinopril 20 milliGRAM(s) Oral daily  senna 2 Tablet(s) Oral at bedtime      ***************************************************  RADIOLOGY & ADDITIONAL TESTS:    Imaging Personally Reviewed:  [ ] YES  [ ] NO    HEALTH ISSUES - PROBLEM Dx:

## 2024-09-03 NOTE — DISCHARGE NOTE PROVIDER - NSDCFUADDAPPT_GEN_ALL_CORE_FT
APPTS ARE READY TO BE MADE: [ ] YES    Best Family or Patient Contact (if needed):    Additional Information about above appointments (if needed):    1: Please follow up with internal medicine doctor, Dr. Chip Gil  2: Please follow up as an outpatient with a pulmonologist,   3:     Other comments or requests:

## 2024-09-03 NOTE — PHYSICAL THERAPY INITIAL EVALUATION ADULT - ADDITIONAL COMMENTS
Pt lives alone in apt, no steps to enter, no steps inside. Pt amb using Rollator, indep with ADL's PTA. Pt has a tub shower.

## 2024-09-03 NOTE — PHYSICAL THERAPY INITIAL EVALUATION ADULT - PERTINENT HX OF CURRENT PROBLEM, REHAB EVAL
Pt is a 78 year old female admitted for SOB progressively for 3 days. Pt with past medical history of HTN, DM, Adult onset Asthma, renal cancer S/P left nephrectomy in remission, HFpEF presenting for 3 days history of gradually worsening shortness of breath with wheezing. SoB is present at rest and not associated with chest pain. Patient denies any recent URT symptoms, sick contact, fever, chills, LLE edema, or other GI or  symptoms.

## 2024-09-03 NOTE — PROGRESS NOTE ADULT - REASON FOR ADMISSION
SoB progressively worsening over past 3 days

## 2024-09-03 NOTE — DISCHARGE NOTE PROVIDER - CARE PROVIDER_API CALL
Chip Gil  Internal Medicine  [address] [place] [address]  Phone: [phone]  Fax: [phone]  Follow Up Time:

## 2024-09-03 NOTE — PHYSICAL THERAPY INITIAL EVALUATION ADULT - LEVEL OF CONSCIOUSNESS, REHAB EVAL
CERTIFICATE OF RETURN TO SCHOOL        Regarding: Darrin Casas        This is to certify that Darrin Casas has been under my care from 11/29/2021 and can return to school on 11/30/2021 if feeling better.  He has been afebrile since onset of his cold symptoms.      RESTRICTIONS: None      REMARKS: Darrin had a NEGATIVE RAPID COVID ANTIGEN TEST in clinic today.         SIGNATURE:___________________________________________,   11/29/2021                          Paulina Hassan MD         1/12/23 Lov8/31/22   alert

## 2024-09-03 NOTE — PROGRESS NOTE ADULT - ASSESSMENT
78 year old female with past medical history of HTN, DM, Asthma, renal cancer S/P left nephrectomy in remission, HFpEF presenting for 3 days history of gradually worsening shortness of breath with wheezing. SoB is present at rest and not associated with chest pain. Patient denies any recent URT symptoms, sick contact, fever, chills, LLE edema, or other GI or  symptoms.     #Acute Hypercapnic and hypoxic Respiratory Failure  #Acute asthma/ COPD  exacerbation  - no recent allergen exposure, URT Sx, or sick contact  - on Ventolin only for mild persistent adult onset Asthma   - Ex-Smoker- quit 50 y ago   - VBG pCO2 62, pH 7.31, pO2 36  - CXR with no evidence of consolidation  - s/p BIPAP in ER, now on 3L NC saturating 97% with improvement in sxs  - Pro-  - s/p Solumedrol 40mg IV BID  > will switch to po prednisone for 5 more days   - c/w DuoNeb Q6H   - Add Symbicort on dc as well.   - weaned  O2 as tolerated.   - (possible undx hx of COPD, lungs appear inflated on xray and pCO2 64)  - Would benefit from pulm follow up as outpt.     #HTN/HLD  #Chronic HFpEF  TTE from last year here showed EF 61%, borderline pHTN, GIDD  - Cont lisinopril 20mg qD  - Cont coreg 6.25 BID  - Cont amlodipine 2.5mg qD  - Cont Farxiga    #DM2  - Insulin regimen    #Renal CA sp nephrectomy  # CKD stage IIIA/B  - Trend BMP  - Outpatient f/u    DVT PPX: Lovenox    #Progress Note Handoff  DC planning.    dw pt regarding plan of care. 
78 year old female with past medical history of HTN, DM, Asthma, renal cancer S/P left nephrectomy in remission, HFpEF presenting for 3 days history of gradually worsening shortness of breath with wheezing. SoB is present at rest and not associated with chest pain. Patient denies any recent URT symptoms, sick contact, fever, chills, LLE edema, or other GI or  symptoms.       #Acute Hypercapnic and hypoxic Respiratory Failure  #Acute asthma/ COPD  exacerbation  - no recent allergen exposure, URT Sx, or sick contact  - on Ventolin only for mild persistent adult onset Asthma   - Ex-Smoker- quit 50 y ago   - VBG pCO2 62, pH 7.31, pO2 36  - CXR with no evidence of consolidation  - s/p BIPAP in ER, now on 3L NC saturating 97% with improvement in sxs  - Pro-  - c/w Solumedrol 40mg IV BID   - c/w DuoNeb Q6H   - Add Symbicort  - c/w NIV overnight and prn during day  - TITRATE OFF O2 as tolerated. might need to check ambulating pulseOx.   - (possible undx hx of COPD, lungs appear inflated on xray and pCO2 64)    #HTN/HLD  #Chronic HFpEF  TTE from last year here showed EF 61%, borderline pHTN, GIDD  - Cont lisinopril 20mg qD  - Cont coreg 6.25 BID  - Cont amlodipine 2.5mg qD  - Cont Farxiga    #DM2  - Insulin regimen    #Renal CA sp nephrectomy  # CKD stage IIIA/B  - Trend BMP  - Outpatient f/u    DVT PPX: Lovenox    #Progress Note Handoff  Pending (specify):  IV steroids/nebs   dw pt regarding plan of care   DC planning in 24 hrs

## 2024-09-03 NOTE — DISCHARGE NOTE PROVIDER - NSDCHC_MEDRECSTATUS_GEN_ALL_CORE
Problem: Adult Inpatient Plan of Care  Goal: Plan of Care Review  Outcome: Ongoing (interventions implemented as appropriate)  Pt AAOx4, VSS, No falls noted, fall precautions remain. Pain assessed,  pain controlled with PRN regimen, pt comfortable, frequent rounds made for safety and patient care. Call light within reach, bed wheels locked, bed in lowest position, side rails ^x2, safety maintained. NADN, Will continue monitor.             Admission Reconciliation is Completed  Discharge Reconciliation is Not Complete Admission Reconciliation is Completed  Discharge Reconciliation is Completed no

## 2024-09-09 ENCOUNTER — RX RENEWAL (OUTPATIENT)
Age: 79
End: 2024-09-09

## 2024-09-09 DIAGNOSIS — Z91.010 ALLERGY TO PEANUTS: ICD-10-CM

## 2024-09-09 DIAGNOSIS — E11.9 TYPE 2 DIABETES MELLITUS WITHOUT COMPLICATIONS: ICD-10-CM

## 2024-09-09 DIAGNOSIS — J96.01 ACUTE RESPIRATORY FAILURE WITH HYPOXIA: ICD-10-CM

## 2024-09-09 DIAGNOSIS — E78.5 HYPERLIPIDEMIA, UNSPECIFIED: ICD-10-CM

## 2024-09-09 DIAGNOSIS — I50.32 CHRONIC DIASTOLIC (CONGESTIVE) HEART FAILURE: ICD-10-CM

## 2024-09-09 DIAGNOSIS — J96.02 ACUTE RESPIRATORY FAILURE WITH HYPERCAPNIA: ICD-10-CM

## 2024-09-09 DIAGNOSIS — I13.10 HYPERTENSIVE HEART AND CHRONIC KIDNEY DISEASE WITHOUT HEART FAILURE, WITH STAGE 1 THROUGH STAGE 4 CHRONIC KIDNEY DISEASE, OR UNSPECIFIED CHRONIC KIDNEY DISEASE: ICD-10-CM

## 2024-09-09 DIAGNOSIS — Z85.528 PERSONAL HISTORY OF OTHER MALIGNANT NEOPLASM OF KIDNEY: ICD-10-CM

## 2024-09-09 DIAGNOSIS — Z87.891 PERSONAL HISTORY OF NICOTINE DEPENDENCE: ICD-10-CM

## 2024-09-09 DIAGNOSIS — J44.1 CHRONIC OBSTRUCTIVE PULMONARY DISEASE WITH (ACUTE) EXACERBATION: ICD-10-CM

## 2024-09-09 DIAGNOSIS — Z79.84 LONG TERM (CURRENT) USE OF ORAL HYPOGLYCEMIC DRUGS: ICD-10-CM

## 2024-09-09 DIAGNOSIS — N18.32 CHRONIC KIDNEY DISEASE, STAGE 3B: ICD-10-CM

## 2024-09-13 NOTE — CHART NOTE - NSCHARTNOTEFT_GEN_A_CORE
pcp & pulm left voicemail 9/4 LW / Pt already has established care with PCP. Emailed pulmonary 9/5 - JL / scheduled on 9/18/24 @ 1:30p w/ Dr. Ruben Healy @ [facility] 9/13-

## 2024-09-25 ENCOUNTER — EMERGENCY (EMERGENCY)
Facility: HOSPITAL | Age: 79
LOS: 0 days | Discharge: HOME | End: 2024-09-26
Attending: EMERGENCY MEDICINE
Payer: MEDICARE

## 2024-09-25 VITALS
WEIGHT: 253.09 LBS | RESPIRATION RATE: 18 BRPM | HEART RATE: 85 BPM | TEMPERATURE: 99 F | SYSTOLIC BLOOD PRESSURE: 169 MMHG | DIASTOLIC BLOOD PRESSURE: 87 MMHG | OXYGEN SATURATION: 97 %

## 2024-09-25 DIAGNOSIS — Z87.891 PERSONAL HISTORY OF NICOTINE DEPENDENCE: ICD-10-CM

## 2024-09-25 DIAGNOSIS — Z90.5 ACQUIRED ABSENCE OF KIDNEY: Chronic | ICD-10-CM

## 2024-09-25 DIAGNOSIS — R03.0 ELEVATED BLOOD-PRESSURE READING, WITHOUT DIAGNOSIS OF HYPERTENSION: ICD-10-CM

## 2024-09-25 DIAGNOSIS — Z98.890 OTHER SPECIFIED POSTPROCEDURAL STATES: Chronic | ICD-10-CM

## 2024-09-25 DIAGNOSIS — Z91.010 ALLERGY TO PEANUTS: ICD-10-CM

## 2024-09-25 DIAGNOSIS — R05.1 ACUTE COUGH: ICD-10-CM

## 2024-09-25 DIAGNOSIS — I10 ESSENTIAL (PRIMARY) HYPERTENSION: ICD-10-CM

## 2024-09-25 PROCEDURE — 99285 EMERGENCY DEPT VISIT HI MDM: CPT

## 2024-09-25 PROCEDURE — 99285 EMERGENCY DEPT VISIT HI MDM: CPT | Mod: 25

## 2024-09-25 PROCEDURE — 71045 X-RAY EXAM CHEST 1 VIEW: CPT

## 2024-09-25 PROCEDURE — 80053 COMPREHEN METABOLIC PANEL: CPT

## 2024-09-25 PROCEDURE — 0241U: CPT

## 2024-09-25 PROCEDURE — 83880 ASSAY OF NATRIURETIC PEPTIDE: CPT

## 2024-09-25 PROCEDURE — 36415 COLL VENOUS BLD VENIPUNCTURE: CPT

## 2024-09-25 PROCEDURE — 93005 ELECTROCARDIOGRAM TRACING: CPT

## 2024-09-25 PROCEDURE — 84484 ASSAY OF TROPONIN QUANT: CPT

## 2024-09-25 PROCEDURE — 93010 ELECTROCARDIOGRAM REPORT: CPT

## 2024-09-25 NOTE — ED ADULT NURSE NOTE - NSFALLUNIVINTERV_ED_ALL_ED
Bed/Stretcher in lowest position, wheels locked, appropriate side rails in place/Call bell, personal items and telephone in reach/Instruct patient to call for assistance before getting out of bed/chair/stretcher/Non-slip footwear applied when patient is off stretcher/[place] to call system/Physically safe environment - no spills, clutter or unnecessary equipment/Purposeful proactive rounding/Room/bathroom lighting operational, light cord in reach

## 2024-09-25 NOTE — ED ADULT TRIAGE NOTE - CHIEF COMPLAINT QUOTE
Pt c/o cough x2 days and SOB x1 day. As per daughter, pt BP readings has been elevated at home -220s. Pt with hx of HTN and compliant with meds

## 2024-09-26 VITALS
RESPIRATION RATE: 18 BRPM | DIASTOLIC BLOOD PRESSURE: 75 MMHG | OXYGEN SATURATION: 97 % | HEART RATE: 71 BPM | SYSTOLIC BLOOD PRESSURE: 143 MMHG | TEMPERATURE: 98 F

## 2024-09-26 LAB
ALBUMIN SERPL ELPH-MCNC: 3.7 G/DL — SIGNIFICANT CHANGE UP (ref 3.5–5.2)
ALP SERPL-CCNC: 104 U/L — SIGNIFICANT CHANGE UP (ref 30–115)
ALT FLD-CCNC: 10 U/L — SIGNIFICANT CHANGE UP (ref 0–41)
ANION GAP SERPL CALC-SCNC: 10 MMOL/L — SIGNIFICANT CHANGE UP (ref 7–14)
AST SERPL-CCNC: 18 U/L — SIGNIFICANT CHANGE UP (ref 0–41)
BILIRUB SERPL-MCNC: 0.4 MG/DL — SIGNIFICANT CHANGE UP (ref 0.2–1.2)
BUN SERPL-MCNC: 15 MG/DL — SIGNIFICANT CHANGE UP (ref 10–20)
CALCIUM SERPL-MCNC: 10.1 MG/DL — SIGNIFICANT CHANGE UP (ref 8.4–10.5)
CHLORIDE SERPL-SCNC: 102 MMOL/L — SIGNIFICANT CHANGE UP (ref 98–110)
CO2 SERPL-SCNC: 29 MMOL/L — SIGNIFICANT CHANGE UP (ref 17–32)
CREAT SERPL-MCNC: 1.3 MG/DL — SIGNIFICANT CHANGE UP (ref 0.7–1.5)
EGFR: 42 ML/MIN/1.73M2 — LOW
FLUAV AG NPH QL: SIGNIFICANT CHANGE UP
FLUBV AG NPH QL: SIGNIFICANT CHANGE UP
GLUCOSE SERPL-MCNC: 157 MG/DL — HIGH (ref 70–99)
NT-PROBNP SERPL-SCNC: 70 PG/ML — SIGNIFICANT CHANGE UP (ref 0–300)
POTASSIUM SERPL-MCNC: 5.3 MMOL/L — HIGH (ref 3.5–5)
POTASSIUM SERPL-SCNC: 5.3 MMOL/L — HIGH (ref 3.5–5)
PROT SERPL-MCNC: 6.5 G/DL — SIGNIFICANT CHANGE UP (ref 6–8)
RSV RNA NPH QL NAA+NON-PROBE: SIGNIFICANT CHANGE UP
SARS-COV-2 RNA SPEC QL NAA+PROBE: SIGNIFICANT CHANGE UP
SODIUM SERPL-SCNC: 141 MMOL/L — SIGNIFICANT CHANGE UP (ref 135–146)
TROPONIN T, HIGH SENSITIVITY RESULT: 10 NG/L — SIGNIFICANT CHANGE UP (ref 6–13)

## 2024-09-26 PROCEDURE — 71045 X-RAY EXAM CHEST 1 VIEW: CPT | Mod: 26

## 2024-09-26 NOTE — ED PROVIDER NOTE - CARE PLAN
Principal Discharge DX:	Transient elevated blood pressure  Assessment and plan of treatment:	cough, elev bp  ekg, cxr, labs, supportive care   1

## 2024-09-26 NOTE — ED PROVIDER NOTE - NSFOLLOWUPINSTRUCTIONS_ED_ALL_ED_FT
Hypertension    Hypertension, commonly called high blood pressure, is when the force of blood pumping through your arteries is too strong. Hypertension forces your heart to work harder to pump blood. Your arteries may become narrow or stiff. Having untreated or uncontrolled hypertension for a long period of time can cause heart attack, stroke, kidney disease, and other problems. If started on a medication, take exactly as prescribed by your health care professional. Maintain a healthy lifestyle and follow up with your primary care physician.    SEEK IMMEDIATE MEDICAL CARE IF YOU HAVE ANY OF THE FOLLOWING SYMPTOMS: severe headache, confusion, chest pain, abdominal pain, vomiting, or shortness of breath.    Shortness of breath    Shortness of breath (dyspnea) means you have trouble breathing and could indicate a medical problem. Causes include lung disease, heart disease, low amount of red blood cells (anemia), poor physical fitness, being overweight, smoking, etc. Your health care provider today may not be able to find a cause for your shortness of breath after your exam. In this case, it is important to have a follow-up exam with your primary care physician as instructed. If medicines were prescribed, take them as directed for the full length of time directed. Refrain from tobacco products.    SEEK IMMEDIATE MEDICAL CARE IF YOU HAVE ANY OF THE FOLLOWING SYMPTOMS: worsening shortness of breath, chest pain, back pain, abdominal pain, fever, coughing up blood, lightheadedness/dizziness.

## 2024-09-26 NOTE — ED PROVIDER NOTE - PATIENT PORTAL LINK FT
You can access the FollowMyHealth Patient Portal offered by [facility] by registering at the following website: [email or web address]. By joining [facility]’s FollowMyHealth portal, you will also be able to view your health information using other applications (apps) compatible with our system.

## 2024-09-26 NOTE — ED PROVIDER NOTE - OBJECTIVE STATEMENT
79 yo f c/o dry cough for 2 days and elev bp. compliant w meds. no ha, cp, sob, live, ab pain, leg swelling. no fever ro chills.

## 2024-10-09 ENCOUNTER — LABORATORY RESULT (OUTPATIENT)
Age: 79
End: 2024-10-09

## 2024-10-09 ENCOUNTER — APPOINTMENT (OUTPATIENT)
Dept: PULMONOLOGY | Facility: CLINIC | Age: 79
End: 2024-10-09
Payer: MEDICARE

## 2024-10-09 ENCOUNTER — NON-APPOINTMENT (OUTPATIENT)
Age: 79
End: 2024-10-09

## 2024-10-09 VITALS
DIASTOLIC BLOOD PRESSURE: 70 MMHG | SYSTOLIC BLOOD PRESSURE: 122 MMHG | BODY MASS INDEX: 43.54 KG/M2 | OXYGEN SATURATION: 97 % | HEART RATE: 62 BPM | WEIGHT: 278 LBS

## 2024-10-09 DIAGNOSIS — J45.909 UNSPECIFIED ASTHMA, UNCOMPLICATED: ICD-10-CM

## 2024-10-09 DIAGNOSIS — R06.2 WHEEZING: ICD-10-CM

## 2024-10-09 DIAGNOSIS — E66.01 MORBID (SEVERE) OBESITY DUE TO EXCESS CALORIES: ICD-10-CM

## 2024-10-09 PROCEDURE — 94010 BREATHING CAPACITY TEST: CPT

## 2024-10-09 PROCEDURE — 99204 OFFICE O/P NEW MOD 45 MIN: CPT | Mod: 25

## 2024-10-09 RX ORDER — BUDESONIDE AND FORMOTEROL FUMARATE DIHYDRATE 160; 4.5 UG/1; UG/1
160-4.5 AEROSOL RESPIRATORY (INHALATION) TWICE DAILY
Qty: 1 | Refills: 3 | Status: ACTIVE | COMMUNITY
Start: 2024-10-09 | End: 1900-01-01

## 2024-10-14 DIAGNOSIS — B44.81 ALLERGIC BRONCHOPULMONARY ASPERGILLOSIS: ICD-10-CM

## 2024-10-21 ENCOUNTER — APPOINTMENT (OUTPATIENT)
Dept: ENDOCRINOLOGY | Facility: CLINIC | Age: 79
End: 2024-10-21
Payer: MEDICARE

## 2024-10-21 VITALS
BODY MASS INDEX: 41.44 KG/M2 | WEIGHT: 264 LBS | HEIGHT: 67 IN | DIASTOLIC BLOOD PRESSURE: 70 MMHG | HEART RATE: 64 BPM | OXYGEN SATURATION: 97 % | RESPIRATION RATE: 18 BRPM | SYSTOLIC BLOOD PRESSURE: 120 MMHG

## 2024-10-21 DIAGNOSIS — Z78.9 OTHER SPECIFIED HEALTH STATUS: ICD-10-CM

## 2024-10-21 DIAGNOSIS — E11.9 TYPE 2 DIABETES MELLITUS W/OUT COMPLICATIONS: ICD-10-CM

## 2024-10-21 DIAGNOSIS — Z90.5 ACQUIRED ABSENCE OF KIDNEY: ICD-10-CM

## 2024-10-21 DIAGNOSIS — E66.01 MORBID (SEVERE) OBESITY DUE TO EXCESS CALORIES: ICD-10-CM

## 2024-10-21 DIAGNOSIS — Z60.2 PROBLEMS RELATED TO LIVING ALONE: ICD-10-CM

## 2024-10-21 DIAGNOSIS — I10 ESSENTIAL (PRIMARY) HYPERTENSION: ICD-10-CM

## 2024-10-21 DIAGNOSIS — N18.31 CHRONIC KIDNEY DISEASE, STAGE 3A: ICD-10-CM

## 2024-10-21 PROCEDURE — 99214 OFFICE O/P EST MOD 30 MIN: CPT

## 2024-10-21 SDOH — SOCIAL STABILITY - SOCIAL INSECURITY: PROBLEMS RELATED TO LIVING ALONE: Z60.2

## 2024-11-11 ENCOUNTER — RESULT REVIEW (OUTPATIENT)
Age: 79
End: 2024-11-11

## 2024-11-11 ENCOUNTER — OUTPATIENT (OUTPATIENT)
Dept: OUTPATIENT SERVICES | Facility: HOSPITAL | Age: 79
LOS: 1 days | End: 2024-11-11
Payer: MEDICARE

## 2024-11-11 DIAGNOSIS — B44.81 ALLERGIC BRONCHOPULMONARY ASPERGILLOSIS: ICD-10-CM

## 2024-11-11 DIAGNOSIS — Z00.8 ENCOUNTER FOR OTHER GENERAL EXAMINATION: ICD-10-CM

## 2024-11-11 DIAGNOSIS — Z90.5 ACQUIRED ABSENCE OF KIDNEY: Chronic | ICD-10-CM

## 2024-11-11 DIAGNOSIS — Z98.890 OTHER SPECIFIED POSTPROCEDURAL STATES: Chronic | ICD-10-CM

## 2024-11-11 PROCEDURE — 71250 CT THORAX DX C-: CPT

## 2024-11-11 PROCEDURE — 71250 CT THORAX DX C-: CPT | Mod: 26

## 2024-11-12 DIAGNOSIS — B44.81 ALLERGIC BRONCHOPULMONARY ASPERGILLOSIS: ICD-10-CM

## 2024-11-19 ENCOUNTER — RX RENEWAL (OUTPATIENT)
Age: 79
End: 2024-11-19

## 2024-11-21 ENCOUNTER — RX RENEWAL (OUTPATIENT)
Age: 79
End: 2024-11-21

## 2024-12-11 ENCOUNTER — APPOINTMENT (OUTPATIENT)
Dept: PULMONOLOGY | Facility: CLINIC | Age: 79
End: 2024-12-11
Payer: MEDICARE

## 2024-12-11 VITALS
RESPIRATION RATE: 16 BRPM | BODY MASS INDEX: 41.35 KG/M2 | OXYGEN SATURATION: 95 % | SYSTOLIC BLOOD PRESSURE: 130 MMHG | WEIGHT: 264 LBS | HEART RATE: 64 BPM | DIASTOLIC BLOOD PRESSURE: 76 MMHG

## 2024-12-11 DIAGNOSIS — E66.01 MORBID (SEVERE) OBESITY DUE TO EXCESS CALORIES: ICD-10-CM

## 2024-12-11 DIAGNOSIS — R06.2 WHEEZING: ICD-10-CM

## 2024-12-11 DIAGNOSIS — R06.00 DYSPNEA, UNSPECIFIED: ICD-10-CM

## 2024-12-11 DIAGNOSIS — J45.909 UNSPECIFIED ASTHMA, UNCOMPLICATED: ICD-10-CM

## 2024-12-11 DIAGNOSIS — Z86.19 PERSONAL HISTORY OF OTHER INFECTIOUS AND PARASITIC DISEASES: ICD-10-CM

## 2024-12-11 PROCEDURE — G2211 COMPLEX E/M VISIT ADD ON: CPT

## 2024-12-11 PROCEDURE — 99214 OFFICE O/P EST MOD 30 MIN: CPT

## 2024-12-11 RX ORDER — DUPILUMAB 300 MG/2ML
300 INJECTION, SOLUTION SUBCUTANEOUS
Qty: 2 | Refills: 0 | Status: ACTIVE | COMMUNITY
Start: 2024-12-11 | End: 1900-01-01

## 2024-12-11 RX ORDER — DUPILUMAB 300 MG/2ML
300 INJECTION, SOLUTION SUBCUTANEOUS
Qty: 4 | Refills: 1 | Status: ACTIVE | COMMUNITY
Start: 2024-12-11 | End: 1900-01-01

## 2024-12-11 RX ORDER — TIOTROPIUM BROMIDE INHALATION SPRAY 3.12 UG/1
2.5 SPRAY, METERED RESPIRATORY (INHALATION) DAILY
Qty: 1 | Refills: 3 | Status: ACTIVE | COMMUNITY
Start: 2024-12-11 | End: 1900-01-01

## 2025-01-02 ENCOUNTER — APPOINTMENT (OUTPATIENT)
Dept: PULMONOLOGY | Facility: HOSPITAL | Age: 80
End: 2025-01-02
Payer: MEDICARE

## 2025-01-02 ENCOUNTER — OUTPATIENT (OUTPATIENT)
Dept: OUTPATIENT SERVICES | Facility: HOSPITAL | Age: 80
LOS: 1 days | End: 2025-01-02
Payer: MEDICARE

## 2025-01-02 DIAGNOSIS — Z90.5 ACQUIRED ABSENCE OF KIDNEY: Chronic | ICD-10-CM

## 2025-01-02 DIAGNOSIS — Z98.890 OTHER SPECIFIED POSTPROCEDURAL STATES: Chronic | ICD-10-CM

## 2025-01-02 DIAGNOSIS — R06.02 SHORTNESS OF BREATH: ICD-10-CM

## 2025-01-02 PROCEDURE — 94727 GAS DIL/WSHOT DETER LNG VOL: CPT | Mod: 26

## 2025-01-02 PROCEDURE — 94060 EVALUATION OF WHEEZING: CPT | Mod: 26

## 2025-01-02 PROCEDURE — 94729 DIFFUSING CAPACITY: CPT | Mod: 26

## 2025-01-02 PROCEDURE — 94729 DIFFUSING CAPACITY: CPT

## 2025-01-02 PROCEDURE — 94664 DEMO&/EVAL PT USE INHALER: CPT

## 2025-01-02 PROCEDURE — 94070 EVALUATION OF WHEEZING: CPT

## 2025-01-02 PROCEDURE — 94726 PLETHYSMOGRAPHY LUNG VOLUMES: CPT

## 2025-01-03 DIAGNOSIS — R06.02 SHORTNESS OF BREATH: ICD-10-CM

## 2025-01-22 ENCOUNTER — APPOINTMENT (OUTPATIENT)
Dept: PULMONOLOGY | Facility: CLINIC | Age: 80
End: 2025-01-22
Payer: MEDICARE

## 2025-01-22 VITALS
RESPIRATION RATE: 14 BRPM | HEART RATE: 75 BPM | OXYGEN SATURATION: 94 % | BODY MASS INDEX: 42.13 KG/M2 | SYSTOLIC BLOOD PRESSURE: 142 MMHG | DIASTOLIC BLOOD PRESSURE: 80 MMHG | WEIGHT: 269 LBS

## 2025-01-22 DIAGNOSIS — R05.9 COUGH, UNSPECIFIED: ICD-10-CM

## 2025-01-22 DIAGNOSIS — Z88.9 ALLERGY STATUS TO UNSPECIFIED DRUGS, MEDICAMENTS AND BIOLOGICAL SUBSTANCES: ICD-10-CM

## 2025-01-22 DIAGNOSIS — R06.00 DYSPNEA, UNSPECIFIED: ICD-10-CM

## 2025-01-22 DIAGNOSIS — J45.50 SEVERE PERSISTENT ASTHMA, UNCOMPLICATED: ICD-10-CM

## 2025-01-22 DIAGNOSIS — R06.2 WHEEZING: ICD-10-CM

## 2025-01-22 PROCEDURE — G2211 COMPLEX E/M VISIT ADD ON: CPT

## 2025-01-22 PROCEDURE — 99214 OFFICE O/P EST MOD 30 MIN: CPT

## 2025-01-22 RX ORDER — ALBUTEROL SULFATE 90 UG/1
108 (90 BASE) INHALANT RESPIRATORY (INHALATION)
Qty: 1 | Refills: 3 | Status: ACTIVE | COMMUNITY
Start: 2025-01-22 | End: 1900-01-01

## 2025-02-24 ENCOUNTER — APPOINTMENT (OUTPATIENT)
Dept: ENDOCRINOLOGY | Facility: CLINIC | Age: 80
End: 2025-02-24
Payer: MEDICARE

## 2025-02-24 VITALS
HEART RATE: 78 BPM | RESPIRATION RATE: 18 BRPM | DIASTOLIC BLOOD PRESSURE: 76 MMHG | HEIGHT: 67 IN | BODY MASS INDEX: 43.79 KG/M2 | SYSTOLIC BLOOD PRESSURE: 120 MMHG | OXYGEN SATURATION: 97 % | WEIGHT: 279 LBS

## 2025-02-24 DIAGNOSIS — E11.9 TYPE 2 DIABETES MELLITUS W/OUT COMPLICATIONS: ICD-10-CM

## 2025-02-24 DIAGNOSIS — E66.01 MORBID (SEVERE) OBESITY DUE TO EXCESS CALORIES: ICD-10-CM

## 2025-02-24 DIAGNOSIS — N18.31 CHRONIC KIDNEY DISEASE, STAGE 3A: ICD-10-CM

## 2025-02-24 DIAGNOSIS — I10 ESSENTIAL (PRIMARY) HYPERTENSION: ICD-10-CM

## 2025-02-24 DIAGNOSIS — R79.89 OTHER SPECIFIED ABNORMAL FINDINGS OF BLOOD CHEMISTRY: ICD-10-CM

## 2025-02-24 PROCEDURE — 99214 OFFICE O/P EST MOD 30 MIN: CPT

## 2025-02-26 ENCOUNTER — RX RENEWAL (OUTPATIENT)
Age: 80
End: 2025-02-26

## 2025-03-10 ENCOUNTER — RX RENEWAL (OUTPATIENT)
Age: 80
End: 2025-03-10

## 2025-03-25 ENCOUNTER — APPOINTMENT (OUTPATIENT)
Dept: PULMONOLOGY | Facility: CLINIC | Age: 80
End: 2025-03-25
Payer: MEDICARE

## 2025-03-25 VITALS
DIASTOLIC BLOOD PRESSURE: 74 MMHG | SYSTOLIC BLOOD PRESSURE: 142 MMHG | WEIGHT: 273 LBS | HEART RATE: 78 BPM | BODY MASS INDEX: 42.76 KG/M2 | OXYGEN SATURATION: 94 %

## 2025-03-25 DIAGNOSIS — Z88.9 ALLERGY STATUS TO UNSPECIFIED DRUGS, MEDICAMENTS AND BIOLOGICAL SUBSTANCES: ICD-10-CM

## 2025-03-25 DIAGNOSIS — J45.50 SEVERE PERSISTENT ASTHMA, UNCOMPLICATED: ICD-10-CM

## 2025-03-25 DIAGNOSIS — R06.00 DYSPNEA, UNSPECIFIED: ICD-10-CM

## 2025-03-25 DIAGNOSIS — R91.8 OTHER NONSPECIFIC ABNORMAL FINDING OF LUNG FIELD: ICD-10-CM

## 2025-03-25 DIAGNOSIS — J45.909 UNSPECIFIED ASTHMA, UNCOMPLICATED: ICD-10-CM

## 2025-03-25 PROCEDURE — 99214 OFFICE O/P EST MOD 30 MIN: CPT

## 2025-03-25 PROCEDURE — G2211 COMPLEX E/M VISIT ADD ON: CPT

## 2025-03-25 RX ORDER — BUDESONIDE AND FORMOTEROL FUMARATE DIHYDRATE 160; 4.5 UG/1; UG/1
160-4.5 AEROSOL RESPIRATORY (INHALATION) TWICE DAILY
Qty: 1 | Refills: 3 | Status: ACTIVE | COMMUNITY
Start: 2025-03-25 | End: 1900-01-01

## 2025-03-25 RX ORDER — PREDNISONE 20 MG/1
20 TABLET ORAL DAILY
Qty: 10 | Refills: 0 | Status: ACTIVE | COMMUNITY
Start: 2025-03-25 | End: 1900-01-01

## 2025-04-15 ENCOUNTER — APPOINTMENT (OUTPATIENT)
Dept: CARDIOLOGY | Facility: CLINIC | Age: 80
End: 2025-04-15

## 2025-04-16 ENCOUNTER — APPOINTMENT (OUTPATIENT)
Dept: CARDIOLOGY | Facility: CLINIC | Age: 80
End: 2025-04-16

## 2025-05-12 NOTE — ED ADULT TRIAGE NOTE - SOURCE OF INFORMATION
Vickie returned call to schedule from referral. Stated pt is needing a hearing test. [facility] couldn't accommodate. Please advise.    Patient

## 2025-06-12 ENCOUNTER — APPOINTMENT (OUTPATIENT)
Dept: CARDIOLOGY | Facility: CLINIC | Age: 80
End: 2025-06-12
Payer: MEDICARE

## 2025-06-12 ENCOUNTER — NON-APPOINTMENT (OUTPATIENT)
Age: 80
End: 2025-06-12

## 2025-06-12 VITALS
HEIGHT: 67 IN | BODY MASS INDEX: 44.1 KG/M2 | DIASTOLIC BLOOD PRESSURE: 100 MMHG | HEART RATE: 57 BPM | SYSTOLIC BLOOD PRESSURE: 160 MMHG | WEIGHT: 281 LBS

## 2025-06-12 PROBLEM — Z82.49 FAMILY HISTORY OF HYPERTENSION: Status: ACTIVE | Noted: 2025-06-12

## 2025-06-12 PROCEDURE — 99213 OFFICE O/P EST LOW 20 MIN: CPT | Mod: 25

## 2025-06-12 PROCEDURE — 93000 ELECTROCARDIOGRAM COMPLETE: CPT

## 2025-06-13 ENCOUNTER — RX RENEWAL (OUTPATIENT)
Age: 80
End: 2025-06-13